# Patient Record
Sex: MALE | Race: WHITE | NOT HISPANIC OR LATINO | Employment: OTHER | URBAN - METROPOLITAN AREA
[De-identification: names, ages, dates, MRNs, and addresses within clinical notes are randomized per-mention and may not be internally consistent; named-entity substitution may affect disease eponyms.]

---

## 2019-09-07 ENCOUNTER — LAB REQUISITION (OUTPATIENT)
Dept: LAB | Facility: HOSPITAL | Age: 59
End: 2019-09-07

## 2019-09-07 DIAGNOSIS — Z00.00 ROUTINE GENERAL MEDICAL EXAMINATION AT A HEALTH CARE FACILITY: ICD-10-CM

## 2019-09-07 LAB
ALBUMIN SERPL-MCNC: 3.8 G/DL (ref 3.5–5.2)
ALBUMIN/GLOB SERPL: 1.2 G/DL
ALP SERPL-CCNC: 86 U/L (ref 39–117)
ALT SERPL W P-5'-P-CCNC: 19 U/L (ref 1–41)
ANION GAP SERPL CALCULATED.3IONS-SCNC: 14.5 MMOL/L (ref 5–15)
AST SERPL-CCNC: 15 U/L (ref 1–40)
BASOPHILS # BLD AUTO: 0.01 10*3/MM3 (ref 0–0.2)
BASOPHILS NFR BLD AUTO: 0.1 % (ref 0–1.5)
BILIRUB SERPL-MCNC: 0.3 MG/DL (ref 0.2–1.2)
BUN BLD-MCNC: 42 MG/DL (ref 6–20)
BUN/CREAT SERPL: 32.6 (ref 7–25)
CALCIUM SPEC-SCNC: 9.2 MG/DL (ref 8.6–10.5)
CHLORIDE SERPL-SCNC: 95 MMOL/L (ref 98–107)
CO2 SERPL-SCNC: 23.5 MMOL/L (ref 22–29)
CREAT BLD-MCNC: 1.29 MG/DL (ref 0.76–1.27)
DEPRECATED RDW RBC AUTO: 47.4 FL (ref 37–54)
EOSINOPHIL # BLD AUTO: 0 10*3/MM3 (ref 0–0.4)
EOSINOPHIL NFR BLD AUTO: 0 % (ref 0.3–6.2)
ERYTHROCYTE [DISTWIDTH] IN BLOOD BY AUTOMATED COUNT: 14.1 % (ref 12.3–15.4)
GFR SERPL CREATININE-BSD FRML MDRD: 57 ML/MIN/1.73
GFR SERPL CREATININE-BSD FRML MDRD: 70 ML/MIN/1.73
GLOBULIN UR ELPH-MCNC: 3.1 GM/DL
GLUCOSE BLD-MCNC: 139 MG/DL (ref 65–99)
HCT VFR BLD AUTO: 35.7 % (ref 37.5–51)
HGB BLD-MCNC: 11.4 G/DL (ref 13–17.7)
IMM GRANULOCYTES # BLD AUTO: 0.04 10*3/MM3 (ref 0–0.05)
IMM GRANULOCYTES NFR BLD AUTO: 0.4 % (ref 0–0.5)
LYMPHOCYTES # BLD AUTO: 0.9 10*3/MM3 (ref 0.7–3.1)
LYMPHOCYTES NFR BLD AUTO: 9.7 % (ref 19.6–45.3)
MCH RBC QN AUTO: 29.8 PG (ref 26.6–33)
MCHC RBC AUTO-ENTMCNC: 31.9 G/DL (ref 31.5–35.7)
MCV RBC AUTO: 93.5 FL (ref 79–97)
MONOCYTES # BLD AUTO: 0.84 10*3/MM3 (ref 0.1–0.9)
MONOCYTES NFR BLD AUTO: 9 % (ref 5–12)
NEUTROPHILS # BLD AUTO: 7.52 10*3/MM3 (ref 1.7–7)
NEUTROPHILS NFR BLD AUTO: 80.8 % (ref 42.7–76)
NRBC BLD AUTO-RTO: 0 /100 WBC (ref 0–0.2)
PLAT MORPH BLD: NORMAL
PLATELET # BLD AUTO: 159 10*3/MM3 (ref 140–450)
PMV BLD AUTO: 11.8 FL (ref 6–12)
POTASSIUM BLD-SCNC: 5.4 MMOL/L (ref 3.5–5.2)
PROT SERPL-MCNC: 6.9 G/DL (ref 6–8.5)
RBC # BLD AUTO: 3.82 10*6/MM3 (ref 4.14–5.8)
RBC MORPH BLD: NORMAL
SODIUM BLD-SCNC: 133 MMOL/L (ref 136–145)
WBC MORPH BLD: NORMAL
WBC NRBC COR # BLD: 9.31 10*3/MM3 (ref 3.4–10.8)

## 2019-09-07 PROCEDURE — 85007 BL SMEAR W/DIFF WBC COUNT: CPT

## 2019-09-07 PROCEDURE — 80053 COMPREHEN METABOLIC PANEL: CPT

## 2019-09-07 PROCEDURE — 85025 COMPLETE CBC W/AUTO DIFF WBC: CPT

## 2020-02-15 ENCOUNTER — LAB REQUISITION (OUTPATIENT)
Dept: LAB | Facility: HOSPITAL | Age: 60
End: 2020-02-15

## 2020-02-15 DIAGNOSIS — Z00.00 ROUTINE GENERAL MEDICAL EXAMINATION AT A HEALTH CARE FACILITY: ICD-10-CM

## 2020-02-15 LAB
BACTERIA UR QL AUTO: ABNORMAL /HPF
BILIRUB UR QL STRIP: NEGATIVE
CLARITY UR: ABNORMAL
COLOR UR: YELLOW
GLUCOSE UR STRIP-MCNC: ABNORMAL MG/DL
HGB UR QL STRIP.AUTO: ABNORMAL
HYALINE CASTS UR QL AUTO: ABNORMAL /LPF
KETONES UR QL STRIP: NEGATIVE
LEUKOCYTE ESTERASE UR QL STRIP.AUTO: ABNORMAL
NITRITE UR QL STRIP: NEGATIVE
PH UR STRIP.AUTO: <=5 [PH] (ref 5–8)
PROT UR QL STRIP: ABNORMAL
RBC # UR: ABNORMAL /HPF
REF LAB TEST METHOD: ABNORMAL
SP GR UR STRIP: 1.02 (ref 1–1.03)
SQUAMOUS #/AREA URNS HPF: ABNORMAL /HPF
UROBILINOGEN UR QL STRIP: ABNORMAL
WBC UR QL AUTO: ABNORMAL /HPF
YEAST URNS QL MICRO: ABNORMAL /HPF

## 2020-02-15 PROCEDURE — 81001 URINALYSIS AUTO W/SCOPE: CPT

## 2020-09-04 ENCOUNTER — HOSPITAL ENCOUNTER (OUTPATIENT)
Dept: GENERAL RADIOLOGY | Facility: HOSPITAL | Age: 60
Discharge: HOME | End: 2020-09-04
Attending: INTERNAL MEDICINE

## 2020-09-20 ENCOUNTER — HOSPITAL ENCOUNTER (OUTPATIENT)
Dept: OTHER | Facility: HOSPITAL | Age: 60
Discharge: HOME | End: 2020-09-20
Attending: INTERNAL MEDICINE

## 2020-12-14 ENCOUNTER — CONVERSION ENCOUNTER (OUTPATIENT)
Dept: OTHER | Facility: HOSPITAL | Age: 60
End: 2020-12-14

## 2020-12-14 ENCOUNTER — OFFICE VISIT CONVERTED (OUTPATIENT)
Dept: GASTROENTEROLOGY | Facility: CLINIC | Age: 60
End: 2020-12-14
Attending: NURSE PRACTITIONER

## 2021-02-03 ENCOUNTER — HOSPITAL ENCOUNTER (OUTPATIENT)
Dept: GASTROENTEROLOGY | Facility: HOSPITAL | Age: 61
Setting detail: HOSPITAL OUTPATIENT SURGERY
Discharge: HOME | End: 2021-02-03
Attending: INTERNAL MEDICINE

## 2021-02-03 LAB — GLUCOSE BLD-MCNC: 90 MG/DL (ref 70–99)

## 2021-05-10 NOTE — H&P
History and Physical      Patient Name: Maynor Geiger   Patient ID: [id number]   Sex: Male   YOB: 1960        Visit Date: December 14, 2020    Provider: HEMANTH Thakkar   Location: [facility] [place]   Location Address: [address] [place] [address]  [place]  [id number]   Location Phone: [phone]          Chief Complaint  · Diarrhea      History Of Present Illness  The patient is a 60 year old male, who presents on referral from Deshaun Saucedo MD, for a gastroenterology evaluation for diarrhea which has been loose to watery and they have been having 5 bowel movements per day when the symptoms occur. The diarrhea has been present daily since it began approximately months ago. The patient admits nocturnal diarrhea.   Besides diarrhea , the patient complains of no other GI tract problems.   He has not traveled out of the country and has not been exposed to contaminated water. The patient has/has not been exposed to recent antibiotics. He has not been exposed to recent antibiotics.      The patient, accompanied by his caregiver, has had diarrhea for several months.  Without any medication, he will have at least 5 loose to watery bowel movements per day.  He will be given Imodium as needed, which helps relieve his diarrhea.  He is currently taking colestipol 1 g twice daily.  He denies rectal bleeding and abdominal pain.  He does report occasional fecal incontinence and nocturnal diarrhea.  He denies nausea and vomiting.  He does not have postprandial bowel movements.  He has never had a colonoscopy.  There is no family history of colon cancer.         Past Medical History  Anxiety; Constipation; Depression; Diabetes; GERD; Hypertension; Hypothyroidism; Insomnia         Past Surgical History  heart surgery         Medication List  amitriptyline 25 mg oral tablet; benztropine 1 mg oral tablet; colestipol 1 gram oral tablet; dicyclomine 10 mg oral capsule; duloxetine 60 mg oral  "capsule,delayed release(DR/EC); gabapentin 800 mg oral tablet; ibuprofen 800 mg oral tablet; Imodium A-D 2 mg oral capsule; Jardiance 25 mg oral tablet; lactulose 10 gram oral packet; levothyroxine 25 mcg oral tablet; lisinopril 20 mg oral tablet; melatonin 5 mg oral tablet; metformin 1,000 mg oral tablet; metoprolol succinate 50 mg oral tablet extended release 24 hr; ondansetron HCl 4 mg oral tablet; pantoprazole 20 mg oral tablet,delayed release (DR/EC); tamsulosin 0.4 mg oral capsule         Allergy List  NO KNOWN DRUG ALLERGIES       Allergies Reconciled  Family Medical History  - No Family History of Colorectal Cancer         Social History  Alcohol; Tobacco (Never)         Review of Systems  · Constitutional  o Denies  o : chills, fever  · Eyes  o Denies  o : blurred vision, changes in vision  · Cardiovascular  o Denies  o : chest pain  · Respiratory  o Denies  o : shortness of breath  · Gastrointestinal  o Denies  o : nausea, vomiting  · Genitourinary  o Denies  o : dysuria, blood in urine  · Integument  o Denies  o : rash  · Neurologic  o Denies  o : tingling or numbness  · Musculoskeletal  o Denies  o : joint pain  · Endocrine  o Denies  o : weight gain, weight loss  · Psychiatric  o Denies  o : anxiety, depression      Vitals  Date Time BP Position Site L\R Cuff Size HR RR TEMP (F) WT  HT  BMI kg/m2 BSA m2 O2 Sat FR L/min FiO2        12/14/2020 02:10 /75 Sitting    107 - R 16  400lbs 0oz 5'  8\" 60.82 2.95 98 %            Physical Examination  · Constitutional  o Appearance  o : morbidly obese, well developed, in wheelchair  · Eyes  o Vision  o :   § Visual Fields  § : eyes move symmetrical in all directions  o Sclerae  o : sclerae anicteric  o Pupils and Irises  o : pupils equal and symetrical  · Neck  o Inspection/Palpation  o : Neck is supple without palpable lymphadenopathy.  o Thyroid  o : Thyroid is midline  · Respiratory  o Respiratory Effort  o : Breathing is unlabored.  o Inspection of " Chest  o : normal appearance, no retractions  o Auscultation of Lungs  o : Chest is clear to auscultation bilaterally.  · Cardiovascular  o Heart  o :   § Auscultation of Heart  § : no murmurs, rubs, or gallops  · Gastrointestinal  o Abdominal Examination  o : Abdomen is soft, nontender to palpation, with normal active bowel sounds, no appreciable hepatosplenomegaly.  · Skin and Subcutaneous Tissue  o General Inspection  o : Skin is without focal lesions. Skin turgor is normal.  · Psychiatric  o Mood and Affect  o : Mood and affect are appropriate to circumstances.          Assessment  · Diarrhea     787.91/R19.7  · Obesity     278.00/E66.9      Plan  · Orders  o Flexible Colonoscopy -Possible risks/complications, benefits, and alternatives to surgical or invasive procedure have been explained to patient and/or legal gaurdian. -Patient has been evaluated and can tolerate anethesia and/or sedation. Risk, benefits, and alternatives to anethesia and/or sedation have been explained to patient and/or legal gaurdian. (28331) - 787.91/R19.7 - 12/14/2020  o Stool culture (22320, 75125) - 787.91/R19.7 - 12/14/2020  o Lactoferrin (Fecal) Qualitative (90530) - 787.91/R19.7 - 12/14/2020  o C difficile Toxigenic Assay (PCR) [facility] (94604) - 787.91/R19.7 - 12/14/2020  o Pancreatic elastase stool (73181) - 787.91/R19.7 - 12/14/2020  o Stool Giardia and Cryptosporidium Enzyme Immunoassay [facility] (79512, 74734) - 787.91/R19.7 - 12/14/2020  o Fecal Fat, Qualitative (20524) - 787.91/R19.7 - 12/14/2020  o Fecal Occult Blood Diagnostic (Send to Lab) [facility] (16616) - 787.91/R19.7 - 12/14/2020  · Medications  o colestipol 1 gram oral tablet   SIG: take 2 tablets (2 gram) by oral route 2 times per day swallowing whole with any liquid. Do not crush, chew and/or divide. for 30 days   DISP: (120) Tablet with 0 refills  Adjusted on 12/14/2020     o Medications have been Reconciled  o Transition of Care or Provider Policy  · Instructions  o 60-year-old  male presenting today for the evaluation of diarrhea. His diarrhea has been present for several months. He believes he may have had his gallbladder removed approximately 4 years ago. I have increase his colestipol to 2 g twice daily. I have also ordered stool studies to rule out infectious causes of his diarrhea. I have recommended that he undergo colonoscopy for his symptoms. The patient is agreeable to the plan.  o Electronically Identified Patient Education Materials Provided Electronically            Electronically Signed by: HEMANTH Thakkar -Author on December 14, 2020 02:29:42 PM

## 2021-05-14 VITALS
BODY MASS INDEX: 47.74 KG/M2 | OXYGEN SATURATION: 98 % | HEART RATE: 107 BPM | SYSTOLIC BLOOD PRESSURE: 144 MMHG | RESPIRATION RATE: 16 BRPM | WEIGHT: 315 LBS | DIASTOLIC BLOOD PRESSURE: 75 MMHG | HEIGHT: 68 IN

## 2021-09-21 ENCOUNTER — APPOINTMENT (OUTPATIENT)
Dept: CT IMAGING | Facility: HOSPITAL | Age: 61
End: 2021-09-21

## 2021-09-21 ENCOUNTER — APPOINTMENT (OUTPATIENT)
Dept: GENERAL RADIOLOGY | Facility: HOSPITAL | Age: 61
End: 2021-09-21

## 2021-09-21 ENCOUNTER — HOSPITAL ENCOUNTER (INPATIENT)
Facility: HOSPITAL | Age: 61
LOS: 3 days | Discharge: NURSING, REHAB OR LONG-TERM CARE | End: 2021-09-24
Attending: EMERGENCY MEDICINE | Admitting: FAMILY MEDICINE

## 2021-09-21 DIAGNOSIS — R13.10 DYSPHAGIA, UNSPECIFIED TYPE: ICD-10-CM

## 2021-09-21 DIAGNOSIS — N28.9 RENAL INSUFFICIENCY: Primary | ICD-10-CM

## 2021-09-21 DIAGNOSIS — Z78.9 DECREASED ACTIVITIES OF DAILY LIVING (ADL): ICD-10-CM

## 2021-09-21 PROBLEM — N17.9 AKI (ACUTE KIDNEY INJURY) (HCC): Status: ACTIVE | Noted: 2021-09-21

## 2021-09-21 LAB
ALBUMIN SERPL-MCNC: 4 G/DL (ref 3.5–5.2)
ALBUMIN/GLOB SERPL: 1.1 G/DL
ALP SERPL-CCNC: 70 U/L (ref 39–117)
ALT SERPL W P-5'-P-CCNC: 8 U/L (ref 1–41)
ANION GAP SERPL CALCULATED.3IONS-SCNC: 13.8 MMOL/L (ref 5–15)
AST SERPL-CCNC: 8 U/L (ref 1–40)
BACTERIA UR QL AUTO: ABNORMAL /HPF
BASOPHILS # BLD AUTO: 0.01 10*3/MM3 (ref 0–0.2)
BASOPHILS NFR BLD AUTO: 0.1 % (ref 0–1.5)
BILIRUB SERPL-MCNC: 0.3 MG/DL (ref 0–1.2)
BILIRUB UR QL STRIP: NEGATIVE
BUN SERPL-MCNC: 55 MG/DL (ref 6–20)
BUN/CREAT SERPL: 24.7 (ref 7–25)
CALCIUM SPEC-SCNC: 9.1 MG/DL (ref 8.6–10.5)
CHLORIDE SERPL-SCNC: 98 MMOL/L (ref 98–107)
CK SERPL-CCNC: 36 U/L (ref 20–200)
CLARITY UR: ABNORMAL
CO2 SERPL-SCNC: 18.2 MMOL/L (ref 22–29)
COLOR UR: YELLOW
CREAT SERPL-MCNC: 2.23 MG/DL (ref 0.76–1.27)
D-LACTATE SERPL-SCNC: 1 MMOL/L (ref 0.5–2)
DEPRECATED RDW RBC AUTO: 45 FL (ref 37–54)
EOSINOPHIL # BLD AUTO: 0.04 10*3/MM3 (ref 0–0.4)
EOSINOPHIL NFR BLD AUTO: 0.4 % (ref 0.3–6.2)
ERYTHROCYTE [DISTWIDTH] IN BLOOD BY AUTOMATED COUNT: 14.6 % (ref 12.3–15.4)
GFR SERPL CREATININE-BSD FRML MDRD: 30 ML/MIN/1.73
GLOBULIN UR ELPH-MCNC: 3.8 GM/DL
GLUCOSE BLDC GLUCOMTR-MCNC: 112 MG/DL (ref 70–99)
GLUCOSE BLDC GLUCOMTR-MCNC: 140 MG/DL (ref 70–99)
GLUCOSE SERPL-MCNC: 146 MG/DL (ref 65–99)
GLUCOSE UR STRIP-MCNC: NEGATIVE MG/DL
HCT VFR BLD AUTO: 37.4 % (ref 37.5–51)
HGB BLD-MCNC: 12.5 G/DL (ref 13–17.7)
HGB UR QL STRIP.AUTO: ABNORMAL
HOLD SPECIMEN: NORMAL
HOLD SPECIMEN: NORMAL
HYALINE CASTS UR QL AUTO: ABNORMAL /LPF
IMM GRANULOCYTES # BLD AUTO: 0.05 10*3/MM3 (ref 0–0.05)
IMM GRANULOCYTES NFR BLD AUTO: 0.5 % (ref 0–0.5)
KETONES UR QL STRIP: ABNORMAL
LEUKOCYTE ESTERASE UR QL STRIP.AUTO: ABNORMAL
LYMPHOCYTES # BLD AUTO: 0.91 10*3/MM3 (ref 0.7–3.1)
LYMPHOCYTES NFR BLD AUTO: 8.8 % (ref 19.6–45.3)
MAGNESIUM SERPL-MCNC: 1.9 MG/DL (ref 1.6–2.6)
MCH RBC QN AUTO: 28 PG (ref 26.6–33)
MCHC RBC AUTO-ENTMCNC: 33.4 G/DL (ref 31.5–35.7)
MCV RBC AUTO: 83.9 FL (ref 79–97)
MONOCYTES # BLD AUTO: 0.95 10*3/MM3 (ref 0.1–0.9)
MONOCYTES NFR BLD AUTO: 9.2 % (ref 5–12)
NEUTROPHILS NFR BLD AUTO: 8.36 10*3/MM3 (ref 1.7–7)
NEUTROPHILS NFR BLD AUTO: 81 % (ref 42.7–76)
NITRITE UR QL STRIP: NEGATIVE
NRBC BLD AUTO-RTO: 0 /100 WBC (ref 0–0.2)
PH UR STRIP.AUTO: <=5 [PH] (ref 5–8)
PLATELET # BLD AUTO: 164 10*3/MM3 (ref 140–450)
PMV BLD AUTO: 10.6 FL (ref 6–12)
POTASSIUM SERPL-SCNC: 4.5 MMOL/L (ref 3.5–5.2)
PROT SERPL-MCNC: 7.8 G/DL (ref 6–8.5)
PROT UR QL STRIP: ABNORMAL
QT INTERVAL: 334 MS
RBC # BLD AUTO: 4.46 10*6/MM3 (ref 4.14–5.8)
RBC # UR: ABNORMAL /HPF
REF LAB TEST METHOD: ABNORMAL
SODIUM SERPL-SCNC: 130 MMOL/L (ref 136–145)
SP GR UR STRIP: 1.01 (ref 1–1.03)
SQUAMOUS #/AREA URNS HPF: ABNORMAL /HPF
TROPONIN T SERPL-MCNC: <0.01 NG/ML (ref 0–0.03)
UROBILINOGEN UR QL STRIP: ABNORMAL
WBC # BLD AUTO: 10.32 10*3/MM3 (ref 3.4–10.8)
WBC UR QL AUTO: ABNORMAL /HPF
WHOLE BLOOD HOLD SPECIMEN: NORMAL
WHOLE BLOOD HOLD SPECIMEN: NORMAL
YEAST URNS QL MICRO: ABNORMAL /HPF

## 2021-09-21 PROCEDURE — 99223 1ST HOSP IP/OBS HIGH 75: CPT | Performed by: FAMILY MEDICINE

## 2021-09-21 PROCEDURE — 80053 COMPREHEN METABOLIC PANEL: CPT | Performed by: EMERGENCY MEDICINE

## 2021-09-21 PROCEDURE — 81001 URINALYSIS AUTO W/SCOPE: CPT | Performed by: EMERGENCY MEDICINE

## 2021-09-21 PROCEDURE — 25010000002 CEFTRIAXONE PER 250 MG: Performed by: FAMILY MEDICINE

## 2021-09-21 PROCEDURE — 71045 X-RAY EXAM CHEST 1 VIEW: CPT

## 2021-09-21 PROCEDURE — 84484 ASSAY OF TROPONIN QUANT: CPT | Performed by: EMERGENCY MEDICINE

## 2021-09-21 PROCEDURE — 82962 GLUCOSE BLOOD TEST: CPT

## 2021-09-21 PROCEDURE — 87040 BLOOD CULTURE FOR BACTERIA: CPT | Performed by: EMERGENCY MEDICINE

## 2021-09-21 PROCEDURE — 70450 CT HEAD/BRAIN W/O DYE: CPT

## 2021-09-21 PROCEDURE — 99285 EMERGENCY DEPT VISIT HI MDM: CPT

## 2021-09-21 PROCEDURE — 83605 ASSAY OF LACTIC ACID: CPT | Performed by: EMERGENCY MEDICINE

## 2021-09-21 PROCEDURE — 87086 URINE CULTURE/COLONY COUNT: CPT | Performed by: EMERGENCY MEDICINE

## 2021-09-21 PROCEDURE — 93010 ELECTROCARDIOGRAM REPORT: CPT | Performed by: INTERNAL MEDICINE

## 2021-09-21 PROCEDURE — 85025 COMPLETE CBC W/AUTO DIFF WBC: CPT | Performed by: EMERGENCY MEDICINE

## 2021-09-21 PROCEDURE — 82550 ASSAY OF CK (CPK): CPT | Performed by: EMERGENCY MEDICINE

## 2021-09-21 PROCEDURE — 83735 ASSAY OF MAGNESIUM: CPT | Performed by: EMERGENCY MEDICINE

## 2021-09-21 PROCEDURE — 93005 ELECTROCARDIOGRAM TRACING: CPT | Performed by: EMERGENCY MEDICINE

## 2021-09-21 RX ORDER — ALUMINA, MAGNESIA, AND SIMETHICONE 2400; 2400; 240 MG/30ML; MG/30ML; MG/30ML
15 SUSPENSION ORAL EVERY 6 HOURS PRN
Status: DISCONTINUED | OUTPATIENT
Start: 2021-09-21 | End: 2021-09-24 | Stop reason: HOSPADM

## 2021-09-21 RX ORDER — NICOTINE POLACRILEX 4 MG
15 LOZENGE BUCCAL
Status: DISCONTINUED | OUTPATIENT
Start: 2021-09-21 | End: 2021-09-24 | Stop reason: HOSPADM

## 2021-09-21 RX ORDER — BISACODYL 5 MG/1
5 TABLET, DELAYED RELEASE ORAL DAILY PRN
Status: DISCONTINUED | OUTPATIENT
Start: 2021-09-21 | End: 2021-09-24 | Stop reason: HOSPADM

## 2021-09-21 RX ORDER — LEVOTHYROXINE SODIUM 0.03 MG/1
25 TABLET ORAL
COMMUNITY

## 2021-09-21 RX ORDER — BENZTROPINE MESYLATE 1 MG/1
1 TABLET ORAL EVERY 12 HOURS SCHEDULED
Status: DISCONTINUED | OUTPATIENT
Start: 2021-09-21 | End: 2021-09-24 | Stop reason: HOSPADM

## 2021-09-21 RX ORDER — BRIMONIDINE TARTRATE AND TIMOLOL MALEATE 2; 5 MG/ML; MG/ML
1 SOLUTION OPHTHALMIC EVERY 12 HOURS
COMMUNITY
End: 2021-12-09

## 2021-09-21 RX ORDER — AMITRIPTYLINE HYDROCHLORIDE 25 MG/1
25 TABLET, FILM COATED ORAL NIGHTLY
Status: DISCONTINUED | OUTPATIENT
Start: 2021-09-21 | End: 2021-09-24 | Stop reason: HOSPADM

## 2021-09-21 RX ORDER — GABAPENTIN 800 MG/1
800 TABLET ORAL 3 TIMES DAILY
COMMUNITY

## 2021-09-21 RX ORDER — SODIUM CHLORIDE 0.9 % (FLUSH) 0.9 %
10 SYRINGE (ML) INJECTION EVERY 12 HOURS SCHEDULED
Status: DISCONTINUED | OUTPATIENT
Start: 2021-09-21 | End: 2021-09-24 | Stop reason: HOSPADM

## 2021-09-21 RX ORDER — DULOXETIN HYDROCHLORIDE 30 MG/1
60 CAPSULE, DELAYED RELEASE ORAL DAILY
Status: DISCONTINUED | OUTPATIENT
Start: 2021-09-21 | End: 2021-09-24 | Stop reason: HOSPADM

## 2021-09-21 RX ORDER — DULOXETIN HYDROCHLORIDE 60 MG/1
60 CAPSULE, DELAYED RELEASE ORAL DAILY
COMMUNITY

## 2021-09-21 RX ORDER — PANTOPRAZOLE SODIUM 20 MG/1
20 TABLET, DELAYED RELEASE ORAL DAILY
COMMUNITY

## 2021-09-21 RX ORDER — NETARSUDIL AND LATANOPROST OPHTHALMIC SOLUTION, 0.02%/0.005% .2; .05 MG/ML; MG/ML
1 SOLUTION/ DROPS OPHTHALMIC; TOPICAL NIGHTLY
COMMUNITY
End: 2021-12-09

## 2021-09-21 RX ORDER — DIPHENOXYLATE HYDROCHLORIDE AND ATROPINE SULFATE 2.5; .025 MG/1; MG/1
1 TABLET ORAL EVERY 6 HOURS PRN
COMMUNITY
End: 2022-03-18 | Stop reason: HOSPADM

## 2021-09-21 RX ORDER — LIDOCAINE 5% 5 G/100G
1 CREAM TOPICAL NIGHTLY PRN
COMMUNITY

## 2021-09-21 RX ORDER — LOPERAMIDE HYDROCHLORIDE 2 MG/1
2 CAPSULE ORAL AS NEEDED
COMMUNITY
End: 2021-12-09

## 2021-09-21 RX ORDER — CYANOCOBALAMIN 1000 UG/ML
1000 INJECTION, SOLUTION INTRAMUSCULAR; SUBCUTANEOUS
COMMUNITY

## 2021-09-21 RX ORDER — ONDANSETRON 2 MG/ML
4 INJECTION INTRAMUSCULAR; INTRAVENOUS EVERY 6 HOURS PRN
Status: DISCONTINUED | OUTPATIENT
Start: 2021-09-21 | End: 2021-09-24 | Stop reason: HOSPADM

## 2021-09-21 RX ORDER — SODIUM CHLORIDE 0.9 % (FLUSH) 0.9 %
10 SYRINGE (ML) INJECTION AS NEEDED
Status: DISCONTINUED | OUTPATIENT
Start: 2021-09-21 | End: 2021-09-24 | Stop reason: HOSPADM

## 2021-09-21 RX ORDER — BENZTROPINE MESYLATE 1 MG/1
1 TABLET ORAL 2 TIMES DAILY
COMMUNITY

## 2021-09-21 RX ORDER — PHENOL 1.4 %
10 AEROSOL, SPRAY (ML) MUCOUS MEMBRANE NIGHTLY
COMMUNITY

## 2021-09-21 RX ORDER — LEVOTHYROXINE SODIUM 0.03 MG/1
25 TABLET ORAL
Status: DISCONTINUED | OUTPATIENT
Start: 2021-09-22 | End: 2021-09-24 | Stop reason: HOSPADM

## 2021-09-21 RX ORDER — GABAPENTIN 300 MG/1
300 CAPSULE ORAL EVERY 8 HOURS SCHEDULED
Status: DISCONTINUED | OUTPATIENT
Start: 2021-09-21 | End: 2021-09-24 | Stop reason: HOSPADM

## 2021-09-21 RX ORDER — POTASSIUM CHLORIDE 20 MEQ/1
20 TABLET, EXTENDED RELEASE ORAL DAILY
COMMUNITY
End: 2022-03-18 | Stop reason: HOSPADM

## 2021-09-21 RX ORDER — AMITRIPTYLINE HYDROCHLORIDE 50 MG/1
50 TABLET, FILM COATED ORAL
COMMUNITY
End: 2022-03-18 | Stop reason: HOSPADM

## 2021-09-21 RX ORDER — TAMSULOSIN HYDROCHLORIDE 0.4 MG/1
0.4 CAPSULE ORAL
COMMUNITY
End: 2022-03-18 | Stop reason: HOSPADM

## 2021-09-21 RX ORDER — LACTULOSE 10 G/15ML
30 SOLUTION ORAL DAILY PRN
COMMUNITY

## 2021-09-21 RX ORDER — CEFTRIAXONE SODIUM 1 G/50ML
1 INJECTION, SOLUTION INTRAVENOUS DAILY
Status: DISCONTINUED | OUTPATIENT
Start: 2021-09-21 | End: 2021-09-24 | Stop reason: HOSPADM

## 2021-09-21 RX ORDER — ONDANSETRON 4 MG/1
4 TABLET, FILM COATED ORAL EVERY 6 HOURS PRN
COMMUNITY
End: 2021-10-19 | Stop reason: ALTCHOICE

## 2021-09-21 RX ORDER — LISINOPRIL 5 MG/1
5 TABLET ORAL DAILY
COMMUNITY
End: 2022-03-18 | Stop reason: HOSPADM

## 2021-09-21 RX ORDER — DORZOLAMIDE HCL 20 MG/ML
1 SOLUTION/ DROPS OPHTHALMIC 2 TIMES DAILY
COMMUNITY
End: 2021-12-09

## 2021-09-21 RX ORDER — ACETAZOLAMIDE 250 MG/1
500 TABLET ORAL 2 TIMES DAILY
Status: DISCONTINUED | OUTPATIENT
Start: 2021-09-21 | End: 2021-09-24 | Stop reason: HOSPADM

## 2021-09-21 RX ORDER — BISACODYL 10 MG
10 SUPPOSITORY, RECTAL RECTAL DAILY PRN
Status: DISCONTINUED | OUTPATIENT
Start: 2021-09-21 | End: 2021-09-24 | Stop reason: HOSPADM

## 2021-09-21 RX ORDER — IBUPROFEN 800 MG/1
800 TABLET ORAL EVERY 6 HOURS PRN
COMMUNITY
End: 2022-03-18 | Stop reason: HOSPADM

## 2021-09-21 RX ORDER — ACETAZOLAMIDE 500 MG/1
500 CAPSULE, EXTENDED RELEASE ORAL 2 TIMES DAILY
COMMUNITY
End: 2021-10-22

## 2021-09-21 RX ORDER — NYSTATIN 100000 [USP'U]/G
1 POWDER TOPICAL 2 TIMES DAILY
COMMUNITY
End: 2021-12-09

## 2021-09-21 RX ORDER — METHENAMINE HIPPURATE 1000 MG/1
1 TABLET ORAL 2 TIMES DAILY WITH MEALS
COMMUNITY

## 2021-09-21 RX ORDER — DEXTROSE MONOHYDRATE 100 MG/ML
25 INJECTION, SOLUTION INTRAVENOUS
Status: DISCONTINUED | OUTPATIENT
Start: 2021-09-21 | End: 2021-09-24 | Stop reason: HOSPADM

## 2021-09-21 RX ORDER — AMOXICILLIN 250 MG
2 CAPSULE ORAL 2 TIMES DAILY
Status: DISCONTINUED | OUTPATIENT
Start: 2021-09-21 | End: 2021-09-24 | Stop reason: HOSPADM

## 2021-09-21 RX ORDER — PANTOPRAZOLE SODIUM 40 MG/1
40 TABLET, DELAYED RELEASE ORAL
Status: DISCONTINUED | OUTPATIENT
Start: 2021-09-22 | End: 2021-09-24 | Stop reason: HOSPADM

## 2021-09-21 RX ORDER — GLYCERIN 0.25 %
2 DROPS OPHTHALMIC (EYE) 3 TIMES DAILY PRN
COMMUNITY
End: 2021-12-09

## 2021-09-21 RX ORDER — ACETAZOLAMIDE 500 MG/1
500 CAPSULE, EXTENDED RELEASE ORAL 2 TIMES DAILY
Status: DISCONTINUED | OUTPATIENT
Start: 2021-09-21 | End: 2021-09-21

## 2021-09-21 RX ORDER — SODIUM CHLORIDE, SODIUM LACTATE, POTASSIUM CHLORIDE, CALCIUM CHLORIDE 600; 310; 30; 20 MG/100ML; MG/100ML; MG/100ML; MG/100ML
100 INJECTION, SOLUTION INTRAVENOUS CONTINUOUS
Status: DISCONTINUED | OUTPATIENT
Start: 2021-09-21 | End: 2021-09-24 | Stop reason: HOSPADM

## 2021-09-21 RX ORDER — ACETAMINOPHEN 325 MG/1
650 TABLET ORAL EVERY 4 HOURS PRN
Status: DISCONTINUED | OUTPATIENT
Start: 2021-09-21 | End: 2021-09-24 | Stop reason: HOSPADM

## 2021-09-21 RX ORDER — TAMSULOSIN HYDROCHLORIDE 0.4 MG/1
0.4 CAPSULE ORAL NIGHTLY
Status: DISCONTINUED | OUTPATIENT
Start: 2021-09-21 | End: 2021-09-24 | Stop reason: HOSPADM

## 2021-09-21 RX ORDER — DICYCLOMINE HYDROCHLORIDE 10 MG/1
10 CAPSULE ORAL
COMMUNITY

## 2021-09-21 RX ORDER — SALIVA STIMULANT COMB. NO.7
1 GEL (GRAM) MUCOUS MEMBRANE EVERY 6 HOURS PRN
COMMUNITY
End: 2021-12-09

## 2021-09-21 RX ORDER — POLYETHYLENE GLYCOL 3350 17 G/17G
17 POWDER, FOR SOLUTION ORAL DAILY PRN
Status: DISCONTINUED | OUTPATIENT
Start: 2021-09-21 | End: 2021-09-24 | Stop reason: HOSPADM

## 2021-09-21 RX ADMIN — SODIUM CHLORIDE, POTASSIUM CHLORIDE, SODIUM LACTATE AND CALCIUM CHLORIDE 1000 ML: 600; 310; 30; 20 INJECTION, SOLUTION INTRAVENOUS at 19:36

## 2021-09-21 RX ADMIN — SODIUM CHLORIDE 1000 ML: 9 INJECTION, SOLUTION INTRAVENOUS at 11:42

## 2021-09-21 RX ADMIN — CEFTRIAXONE SODIUM 1 G: 1 INJECTION, SOLUTION INTRAVENOUS at 17:23

## 2021-09-21 RX ADMIN — SODIUM CHLORIDE, POTASSIUM CHLORIDE, SODIUM LACTATE AND CALCIUM CHLORIDE 100 ML/HR: 600; 310; 30; 20 INJECTION, SOLUTION INTRAVENOUS at 17:44

## 2021-09-22 LAB
ALBUMIN SERPL-MCNC: 3.2 G/DL (ref 3.5–5.2)
ALP SERPL-CCNC: 64 U/L (ref 39–117)
ALT SERPL W P-5'-P-CCNC: 8 U/L (ref 1–41)
ANION GAP SERPL CALCULATED.3IONS-SCNC: 13.2 MMOL/L (ref 5–15)
AST SERPL-CCNC: 9 U/L (ref 1–40)
BACTERIA SPEC AEROBE CULT: ABNORMAL
BASOPHILS # BLD AUTO: 0.01 10*3/MM3 (ref 0–0.2)
BASOPHILS NFR BLD AUTO: 0.2 % (ref 0–1.5)
BILIRUB CONJ SERPL-MCNC: <0.2 MG/DL (ref 0–0.3)
BILIRUB INDIRECT SERPL-MCNC: ABNORMAL MG/DL
BILIRUB SERPL-MCNC: 0.3 MG/DL (ref 0–1.2)
BUN SERPL-MCNC: 34 MG/DL (ref 6–20)
BUN/CREAT SERPL: 30.4 (ref 7–25)
CALCIUM SPEC-SCNC: 8.8 MG/DL (ref 8.6–10.5)
CHLORIDE SERPL-SCNC: 101 MMOL/L (ref 98–107)
CO2 SERPL-SCNC: 15.8 MMOL/L (ref 22–29)
CREAT SERPL-MCNC: 1.12 MG/DL (ref 0.76–1.27)
DEPRECATED RDW RBC AUTO: 47.1 FL (ref 37–54)
EOSINOPHIL # BLD AUTO: 0.12 10*3/MM3 (ref 0–0.4)
EOSINOPHIL NFR BLD AUTO: 2 % (ref 0.3–6.2)
ERYTHROCYTE [DISTWIDTH] IN BLOOD BY AUTOMATED COUNT: 14.9 % (ref 12.3–15.4)
GFR SERPL CREATININE-BSD FRML MDRD: 67 ML/MIN/1.73
GLUCOSE BLDC GLUCOMTR-MCNC: 104 MG/DL (ref 70–99)
GLUCOSE BLDC GLUCOMTR-MCNC: 110 MG/DL (ref 70–99)
GLUCOSE BLDC GLUCOMTR-MCNC: 111 MG/DL (ref 70–99)
GLUCOSE BLDC GLUCOMTR-MCNC: 93 MG/DL (ref 70–99)
GLUCOSE SERPL-MCNC: 127 MG/DL (ref 65–99)
HCT VFR BLD AUTO: 36.2 % (ref 37.5–51)
HGB BLD-MCNC: 11.7 G/DL (ref 13–17.7)
IMM GRANULOCYTES # BLD AUTO: 0.01 10*3/MM3 (ref 0–0.05)
IMM GRANULOCYTES NFR BLD AUTO: 0.2 % (ref 0–0.5)
LYMPHOCYTES # BLD AUTO: 1.05 10*3/MM3 (ref 0.7–3.1)
LYMPHOCYTES NFR BLD AUTO: 17.3 % (ref 19.6–45.3)
MAGNESIUM SERPL-MCNC: 1.7 MG/DL (ref 1.6–2.6)
MCH RBC QN AUTO: 28.1 PG (ref 26.6–33)
MCHC RBC AUTO-ENTMCNC: 32.3 G/DL (ref 31.5–35.7)
MCV RBC AUTO: 86.8 FL (ref 79–97)
MONOCYTES # BLD AUTO: 0.54 10*3/MM3 (ref 0.1–0.9)
MONOCYTES NFR BLD AUTO: 8.9 % (ref 5–12)
NEUTROPHILS NFR BLD AUTO: 4.35 10*3/MM3 (ref 1.7–7)
NEUTROPHILS NFR BLD AUTO: 71.4 % (ref 42.7–76)
NRBC BLD AUTO-RTO: 0 /100 WBC (ref 0–0.2)
PHOSPHATE SERPL-MCNC: 2.4 MG/DL (ref 2.5–4.5)
PLATELET # BLD AUTO: 142 10*3/MM3 (ref 140–450)
PMV BLD AUTO: 11 FL (ref 6–12)
POTASSIUM SERPL-SCNC: 4 MMOL/L (ref 3.5–5.2)
PROT SERPL-MCNC: 7 G/DL (ref 6–8.5)
RBC # BLD AUTO: 4.17 10*6/MM3 (ref 4.14–5.8)
SODIUM SERPL-SCNC: 130 MMOL/L (ref 136–145)
WBC # BLD AUTO: 6.08 10*3/MM3 (ref 3.4–10.8)

## 2021-09-22 PROCEDURE — 92610 EVALUATE SWALLOWING FUNCTION: CPT

## 2021-09-22 PROCEDURE — 97165 OT EVAL LOW COMPLEX 30 MIN: CPT

## 2021-09-22 PROCEDURE — 99232 SBSQ HOSP IP/OBS MODERATE 35: CPT | Performed by: FAMILY MEDICINE

## 2021-09-22 PROCEDURE — 25010000002 CEFTRIAXONE PER 250 MG: Performed by: FAMILY MEDICINE

## 2021-09-22 PROCEDURE — 83735 ASSAY OF MAGNESIUM: CPT | Performed by: FAMILY MEDICINE

## 2021-09-22 PROCEDURE — 36415 COLL VENOUS BLD VENIPUNCTURE: CPT | Performed by: FAMILY MEDICINE

## 2021-09-22 PROCEDURE — 82962 GLUCOSE BLOOD TEST: CPT

## 2021-09-22 PROCEDURE — 80076 HEPATIC FUNCTION PANEL: CPT | Performed by: FAMILY MEDICINE

## 2021-09-22 PROCEDURE — 97161 PT EVAL LOW COMPLEX 20 MIN: CPT

## 2021-09-22 PROCEDURE — 84100 ASSAY OF PHOSPHORUS: CPT | Performed by: FAMILY MEDICINE

## 2021-09-22 PROCEDURE — 80048 BASIC METABOLIC PNL TOTAL CA: CPT | Performed by: FAMILY MEDICINE

## 2021-09-22 PROCEDURE — 85025 COMPLETE CBC W/AUTO DIFF WBC: CPT | Performed by: FAMILY MEDICINE

## 2021-09-22 RX ADMIN — DOCUSATE SODIUM 50MG AND SENNOSIDES 8.6MG 2 TABLET: 8.6; 5 TABLET, FILM COATED ORAL at 23:05

## 2021-09-22 RX ADMIN — SODIUM CHLORIDE, POTASSIUM CHLORIDE, SODIUM LACTATE AND CALCIUM CHLORIDE 100 ML/HR: 600; 310; 30; 20 INJECTION, SOLUTION INTRAVENOUS at 23:03

## 2021-09-22 RX ADMIN — TAMSULOSIN HYDROCHLORIDE 0.4 MG: 0.4 CAPSULE ORAL at 23:04

## 2021-09-22 RX ADMIN — BENZTROPINE MESYLATE 1 MG: 1 TABLET ORAL at 08:54

## 2021-09-22 RX ADMIN — AMITRIPTYLINE HYDROCHLORIDE 25 MG: 25 TABLET, FILM COATED ORAL at 23:03

## 2021-09-22 RX ADMIN — ACETAMINOPHEN 650 MG: 325 TABLET ORAL at 03:41

## 2021-09-22 RX ADMIN — PANTOPRAZOLE SODIUM 40 MG: 40 TABLET, DELAYED RELEASE ORAL at 05:45

## 2021-09-22 RX ADMIN — GABAPENTIN 300 MG: 300 CAPSULE ORAL at 16:19

## 2021-09-22 RX ADMIN — SODIUM CHLORIDE, POTASSIUM CHLORIDE, SODIUM LACTATE AND CALCIUM CHLORIDE 100 ML/HR: 600; 310; 30; 20 INJECTION, SOLUTION INTRAVENOUS at 11:30

## 2021-09-22 RX ADMIN — GABAPENTIN 300 MG: 300 CAPSULE ORAL at 05:45

## 2021-09-22 RX ADMIN — SODIUM CHLORIDE, POTASSIUM CHLORIDE, SODIUM LACTATE AND CALCIUM CHLORIDE 100 ML/HR: 600; 310; 30; 20 INJECTION, SOLUTION INTRAVENOUS at 01:17

## 2021-09-22 RX ADMIN — ACETAZOLAMIDE 500 MG: 250 TABLET ORAL at 23:05

## 2021-09-22 RX ADMIN — ACETAZOLAMIDE 500 MG: 250 TABLET ORAL at 08:53

## 2021-09-22 RX ADMIN — SODIUM CHLORIDE, POTASSIUM CHLORIDE, SODIUM LACTATE AND CALCIUM CHLORIDE 500 ML: 600; 310; 30; 20 INJECTION, SOLUTION INTRAVENOUS at 00:36

## 2021-09-22 RX ADMIN — BENZTROPINE MESYLATE 1 MG: 1 TABLET ORAL at 23:04

## 2021-09-22 RX ADMIN — DOCUSATE SODIUM 50MG AND SENNOSIDES 8.6MG 2 TABLET: 8.6; 5 TABLET, FILM COATED ORAL at 08:55

## 2021-09-22 RX ADMIN — DULOXETINE HYDROCHLORIDE 60 MG: 30 CAPSULE, DELAYED RELEASE ORAL at 08:54

## 2021-09-22 RX ADMIN — SODIUM CHLORIDE, PRESERVATIVE FREE 10 ML: 5 INJECTION INTRAVENOUS at 08:55

## 2021-09-22 RX ADMIN — GABAPENTIN 300 MG: 300 CAPSULE ORAL at 23:04

## 2021-09-22 RX ADMIN — CEFTRIAXONE SODIUM 1 G: 1 INJECTION, SOLUTION INTRAVENOUS at 11:47

## 2021-09-22 RX ADMIN — LEVOTHYROXINE SODIUM 25 MCG: 0.03 TABLET ORAL at 05:45

## 2021-09-23 LAB
ALBUMIN SERPL-MCNC: 3.2 G/DL (ref 3.5–5.2)
ALP SERPL-CCNC: 65 U/L (ref 39–117)
ALT SERPL W P-5'-P-CCNC: 9 U/L (ref 1–41)
ANION GAP SERPL CALCULATED.3IONS-SCNC: 13.7 MMOL/L (ref 5–15)
AST SERPL-CCNC: 12 U/L (ref 1–40)
BASOPHILS # BLD AUTO: 0 10*3/MM3 (ref 0–0.2)
BASOPHILS NFR BLD AUTO: 0 % (ref 0–1.5)
BILIRUB CONJ SERPL-MCNC: <0.2 MG/DL (ref 0–0.3)
BILIRUB INDIRECT SERPL-MCNC: ABNORMAL MG/DL
BILIRUB SERPL-MCNC: 0.3 MG/DL (ref 0–1.2)
BUN SERPL-MCNC: 26 MG/DL (ref 6–20)
BUN/CREAT SERPL: 26 (ref 7–25)
CALCIUM SPEC-SCNC: 9.1 MG/DL (ref 8.6–10.5)
CHLORIDE SERPL-SCNC: 100 MMOL/L (ref 98–107)
CO2 SERPL-SCNC: 15.3 MMOL/L (ref 22–29)
CREAT SERPL-MCNC: 1 MG/DL (ref 0.76–1.27)
DEPRECATED RDW RBC AUTO: 45.8 FL (ref 37–54)
EOSINOPHIL # BLD AUTO: 0.15 10*3/MM3 (ref 0–0.4)
EOSINOPHIL NFR BLD AUTO: 2.3 % (ref 0.3–6.2)
ERYTHROCYTE [DISTWIDTH] IN BLOOD BY AUTOMATED COUNT: 14.8 % (ref 12.3–15.4)
GFR SERPL CREATININE-BSD FRML MDRD: 76 ML/MIN/1.73
GLUCOSE BLDC GLUCOMTR-MCNC: 128 MG/DL (ref 70–99)
GLUCOSE BLDC GLUCOMTR-MCNC: 92 MG/DL (ref 70–99)
GLUCOSE BLDC GLUCOMTR-MCNC: 99 MG/DL (ref 70–99)
GLUCOSE SERPL-MCNC: 104 MG/DL (ref 65–99)
HCT VFR BLD AUTO: 33.4 % (ref 37.5–51)
HGB BLD-MCNC: 11 G/DL (ref 13–17.7)
IMM GRANULOCYTES # BLD AUTO: 0.04 10*3/MM3 (ref 0–0.05)
IMM GRANULOCYTES NFR BLD AUTO: 0.6 % (ref 0–0.5)
LYMPHOCYTES # BLD AUTO: 0.99 10*3/MM3 (ref 0.7–3.1)
LYMPHOCYTES NFR BLD AUTO: 15 % (ref 19.6–45.3)
MAGNESIUM SERPL-MCNC: 1.6 MG/DL (ref 1.6–2.6)
MCH RBC QN AUTO: 28.1 PG (ref 26.6–33)
MCHC RBC AUTO-ENTMCNC: 32.9 G/DL (ref 31.5–35.7)
MCV RBC AUTO: 85.2 FL (ref 79–97)
MONOCYTES # BLD AUTO: 0.7 10*3/MM3 (ref 0.1–0.9)
MONOCYTES NFR BLD AUTO: 10.6 % (ref 5–12)
NEUTROPHILS NFR BLD AUTO: 4.71 10*3/MM3 (ref 1.7–7)
NEUTROPHILS NFR BLD AUTO: 71.5 % (ref 42.7–76)
NRBC BLD AUTO-RTO: 0 /100 WBC (ref 0–0.2)
PHOSPHATE SERPL-MCNC: 2.9 MG/DL (ref 2.5–4.5)
PLATELET # BLD AUTO: 136 10*3/MM3 (ref 140–450)
PMV BLD AUTO: 11.3 FL (ref 6–12)
POTASSIUM SERPL-SCNC: 4.6 MMOL/L (ref 3.5–5.2)
PROT SERPL-MCNC: 7.3 G/DL (ref 6–8.5)
RBC # BLD AUTO: 3.92 10*6/MM3 (ref 4.14–5.8)
RBC MORPH BLD: NORMAL
SMALL PLATELETS BLD QL SMEAR: NORMAL
SODIUM SERPL-SCNC: 129 MMOL/L (ref 136–145)
WBC # BLD AUTO: 6.59 10*3/MM3 (ref 3.4–10.8)
WBC MORPH BLD: NORMAL

## 2021-09-23 PROCEDURE — 80076 HEPATIC FUNCTION PANEL: CPT | Performed by: FAMILY MEDICINE

## 2021-09-23 PROCEDURE — 87635 SARS-COV-2 COVID-19 AMP PRB: CPT | Performed by: FAMILY MEDICINE

## 2021-09-23 PROCEDURE — 83735 ASSAY OF MAGNESIUM: CPT | Performed by: FAMILY MEDICINE

## 2021-09-23 PROCEDURE — 80048 BASIC METABOLIC PNL TOTAL CA: CPT | Performed by: FAMILY MEDICINE

## 2021-09-23 PROCEDURE — 25010000002 CEFTRIAXONE PER 250 MG: Performed by: FAMILY MEDICINE

## 2021-09-23 PROCEDURE — 84100 ASSAY OF PHOSPHORUS: CPT | Performed by: FAMILY MEDICINE

## 2021-09-23 PROCEDURE — 82962 GLUCOSE BLOOD TEST: CPT

## 2021-09-23 PROCEDURE — 85007 BL SMEAR W/DIFF WBC COUNT: CPT | Performed by: FAMILY MEDICINE

## 2021-09-23 PROCEDURE — 85025 COMPLETE CBC W/AUTO DIFF WBC: CPT | Performed by: FAMILY MEDICINE

## 2021-09-23 PROCEDURE — 99239 HOSP IP/OBS DSCHRG MGMT >30: CPT | Performed by: FAMILY MEDICINE

## 2021-09-23 RX ORDER — CEFDINIR 300 MG/1
300 CAPSULE ORAL 2 TIMES DAILY
Qty: 14 CAPSULE | Refills: 0 | Status: SHIPPED | OUTPATIENT
Start: 2021-09-23 | End: 2021-09-30

## 2021-09-23 RX ADMIN — BENZTROPINE MESYLATE 1 MG: 1 TABLET ORAL at 08:50

## 2021-09-23 RX ADMIN — GABAPENTIN 300 MG: 300 CAPSULE ORAL at 07:56

## 2021-09-23 RX ADMIN — CEFTRIAXONE SODIUM 1 G: 1 INJECTION, SOLUTION INTRAVENOUS at 08:49

## 2021-09-23 RX ADMIN — SODIUM CHLORIDE, PRESERVATIVE FREE 10 ML: 5 INJECTION INTRAVENOUS at 20:21

## 2021-09-23 RX ADMIN — ACETAZOLAMIDE 500 MG: 250 TABLET ORAL at 08:50

## 2021-09-23 RX ADMIN — DOCUSATE SODIUM 50MG AND SENNOSIDES 8.6MG 2 TABLET: 8.6; 5 TABLET, FILM COATED ORAL at 20:21

## 2021-09-23 RX ADMIN — GABAPENTIN 300 MG: 300 CAPSULE ORAL at 20:20

## 2021-09-23 RX ADMIN — AMITRIPTYLINE HYDROCHLORIDE 25 MG: 25 TABLET, FILM COATED ORAL at 20:21

## 2021-09-23 RX ADMIN — TAMSULOSIN HYDROCHLORIDE 0.4 MG: 0.4 CAPSULE ORAL at 20:21

## 2021-09-23 RX ADMIN — GABAPENTIN 300 MG: 300 CAPSULE ORAL at 14:05

## 2021-09-23 RX ADMIN — PANTOPRAZOLE SODIUM 40 MG: 40 TABLET, DELAYED RELEASE ORAL at 07:55

## 2021-09-23 RX ADMIN — LEVOTHYROXINE SODIUM 25 MCG: 0.03 TABLET ORAL at 07:55

## 2021-09-23 RX ADMIN — BENZTROPINE MESYLATE 1 MG: 1 TABLET ORAL at 20:21

## 2021-09-23 RX ADMIN — SODIUM CHLORIDE, POTASSIUM CHLORIDE, SODIUM LACTATE AND CALCIUM CHLORIDE 100 ML/HR: 600; 310; 30; 20 INJECTION, SOLUTION INTRAVENOUS at 08:52

## 2021-09-23 RX ADMIN — DOCUSATE SODIUM 50MG AND SENNOSIDES 8.6MG 2 TABLET: 8.6; 5 TABLET, FILM COATED ORAL at 08:50

## 2021-09-23 RX ADMIN — DULOXETINE HYDROCHLORIDE 60 MG: 30 CAPSULE, DELAYED RELEASE ORAL at 08:50

## 2021-09-23 RX ADMIN — ACETAZOLAMIDE 500 MG: 250 TABLET ORAL at 20:20

## 2021-09-24 VITALS
RESPIRATION RATE: 18 BRPM | BODY MASS INDEX: 50.62 KG/M2 | DIASTOLIC BLOOD PRESSURE: 72 MMHG | SYSTOLIC BLOOD PRESSURE: 129 MMHG | WEIGHT: 315 LBS | TEMPERATURE: 97.8 F | HEART RATE: 100 BPM | HEIGHT: 66 IN | OXYGEN SATURATION: 100 %

## 2021-09-24 LAB
ANION GAP SERPL CALCULATED.3IONS-SCNC: 11.9 MMOL/L (ref 5–15)
BASOPHILS # BLD AUTO: 0.01 10*3/MM3 (ref 0–0.2)
BASOPHILS NFR BLD AUTO: 0.2 % (ref 0–1.5)
BUN SERPL-MCNC: 19 MG/DL (ref 6–20)
BUN/CREAT SERPL: 22.4 (ref 7–25)
CALCIUM SPEC-SCNC: 8.8 MG/DL (ref 8.6–10.5)
CHLORIDE SERPL-SCNC: 99 MMOL/L (ref 98–107)
CO2 SERPL-SCNC: 19.1 MMOL/L (ref 22–29)
CREAT SERPL-MCNC: 0.85 MG/DL (ref 0.76–1.27)
DEPRECATED RDW RBC AUTO: 45.6 FL (ref 37–54)
EOSINOPHIL # BLD AUTO: 0.13 10*3/MM3 (ref 0–0.4)
EOSINOPHIL NFR BLD AUTO: 2.1 % (ref 0.3–6.2)
ERYTHROCYTE [DISTWIDTH] IN BLOOD BY AUTOMATED COUNT: 14.8 % (ref 12.3–15.4)
GFR SERPL CREATININE-BSD FRML MDRD: 92 ML/MIN/1.73
GLUCOSE BLDC GLUCOMTR-MCNC: 113 MG/DL (ref 70–99)
GLUCOSE BLDC GLUCOMTR-MCNC: 131 MG/DL (ref 70–99)
GLUCOSE SERPL-MCNC: 117 MG/DL (ref 65–99)
HCT VFR BLD AUTO: 33.9 % (ref 37.5–51)
HGB BLD-MCNC: 11.3 G/DL (ref 13–17.7)
IMM GRANULOCYTES # BLD AUTO: 0.02 10*3/MM3 (ref 0–0.05)
IMM GRANULOCYTES NFR BLD AUTO: 0.3 % (ref 0–0.5)
LYMPHOCYTES # BLD AUTO: 0.82 10*3/MM3 (ref 0.7–3.1)
LYMPHOCYTES NFR BLD AUTO: 13.1 % (ref 19.6–45.3)
MAGNESIUM SERPL-MCNC: 1.4 MG/DL (ref 1.6–2.6)
MCH RBC QN AUTO: 28.2 PG (ref 26.6–33)
MCHC RBC AUTO-ENTMCNC: 33.3 G/DL (ref 31.5–35.7)
MCV RBC AUTO: 84.5 FL (ref 79–97)
MONOCYTES # BLD AUTO: 0.57 10*3/MM3 (ref 0.1–0.9)
MONOCYTES NFR BLD AUTO: 9.1 % (ref 5–12)
NEUTROPHILS NFR BLD AUTO: 4.73 10*3/MM3 (ref 1.7–7)
NEUTROPHILS NFR BLD AUTO: 75.2 % (ref 42.7–76)
NRBC BLD AUTO-RTO: 0 /100 WBC (ref 0–0.2)
PHOSPHATE SERPL-MCNC: 3 MG/DL (ref 2.5–4.5)
PLATELET # BLD AUTO: 152 10*3/MM3 (ref 140–450)
PMV BLD AUTO: 11.1 FL (ref 6–12)
POTASSIUM SERPL-SCNC: 3.4 MMOL/L (ref 3.5–5.2)
RBC # BLD AUTO: 4.01 10*6/MM3 (ref 4.14–5.8)
SARS-COV-2 N GENE RESP QL NAA+PROBE: NOT DETECTED
SODIUM SERPL-SCNC: 130 MMOL/L (ref 136–145)
WBC # BLD AUTO: 6.28 10*3/MM3 (ref 3.4–10.8)

## 2021-09-24 PROCEDURE — 80048 BASIC METABOLIC PNL TOTAL CA: CPT | Performed by: FAMILY MEDICINE

## 2021-09-24 PROCEDURE — 83735 ASSAY OF MAGNESIUM: CPT | Performed by: FAMILY MEDICINE

## 2021-09-24 PROCEDURE — 25010000002 CEFTRIAXONE PER 250 MG: Performed by: FAMILY MEDICINE

## 2021-09-24 PROCEDURE — 82962 GLUCOSE BLOOD TEST: CPT

## 2021-09-24 PROCEDURE — 85025 COMPLETE CBC W/AUTO DIFF WBC: CPT | Performed by: FAMILY MEDICINE

## 2021-09-24 PROCEDURE — 84100 ASSAY OF PHOSPHORUS: CPT | Performed by: FAMILY MEDICINE

## 2021-09-24 RX ORDER — POTASSIUM CHLORIDE 750 MG/1
20 CAPSULE, EXTENDED RELEASE ORAL ONCE
Status: COMPLETED | OUTPATIENT
Start: 2021-09-24 | End: 2021-09-24

## 2021-09-24 RX ADMIN — LEVOTHYROXINE SODIUM 25 MCG: 0.03 TABLET ORAL at 04:57

## 2021-09-24 RX ADMIN — SODIUM CHLORIDE, PRESERVATIVE FREE 10 ML: 5 INJECTION INTRAVENOUS at 10:02

## 2021-09-24 RX ADMIN — ACETAZOLAMIDE 500 MG: 250 TABLET ORAL at 10:00

## 2021-09-24 RX ADMIN — BENZTROPINE MESYLATE 1 MG: 1 TABLET ORAL at 09:59

## 2021-09-24 RX ADMIN — POTASSIUM CHLORIDE 20 MEQ: 10 CAPSULE, COATED, EXTENDED RELEASE ORAL at 10:00

## 2021-09-24 RX ADMIN — DULOXETINE HYDROCHLORIDE 60 MG: 30 CAPSULE, DELAYED RELEASE ORAL at 10:00

## 2021-09-24 RX ADMIN — PANTOPRAZOLE SODIUM 40 MG: 40 TABLET, DELAYED RELEASE ORAL at 04:57

## 2021-09-24 RX ADMIN — CEFTRIAXONE SODIUM 1 G: 1 INJECTION, SOLUTION INTRAVENOUS at 10:00

## 2021-09-24 RX ADMIN — SODIUM CHLORIDE, POTASSIUM CHLORIDE, SODIUM LACTATE AND CALCIUM CHLORIDE 100 ML/HR: 600; 310; 30; 20 INJECTION, SOLUTION INTRAVENOUS at 04:57

## 2021-09-24 RX ADMIN — GABAPENTIN 300 MG: 300 CAPSULE ORAL at 04:58

## 2021-09-24 RX ADMIN — DOCUSATE SODIUM 50MG AND SENNOSIDES 8.6MG 2 TABLET: 8.6; 5 TABLET, FILM COATED ORAL at 10:59

## 2021-09-26 LAB
BACTERIA SPEC AEROBE CULT: NORMAL
BACTERIA SPEC AEROBE CULT: NORMAL

## 2021-10-05 ENCOUNTER — APPOINTMENT (OUTPATIENT)
Dept: GENERAL RADIOLOGY | Facility: HOSPITAL | Age: 61
End: 2021-10-05

## 2021-10-05 ENCOUNTER — HOSPITAL ENCOUNTER (EMERGENCY)
Facility: HOSPITAL | Age: 61
Discharge: HOME | End: 2021-10-05
Attending: EMERGENCY MEDICINE | Admitting: EMERGENCY MEDICINE

## 2021-10-05 VITALS
HEART RATE: 92 BPM | BODY MASS INDEX: 53.41 KG/M2 | SYSTOLIC BLOOD PRESSURE: 121 MMHG | TEMPERATURE: 99.1 F | WEIGHT: 315 LBS | DIASTOLIC BLOOD PRESSURE: 91 MMHG | RESPIRATION RATE: 14 BRPM | OXYGEN SATURATION: 95 %

## 2021-10-05 DIAGNOSIS — R41.82 ALTERED MENTAL STATUS, UNSPECIFIED ALTERED MENTAL STATUS TYPE: Primary | ICD-10-CM

## 2021-10-05 DIAGNOSIS — T50.905A ADVERSE EFFECT OF DRUG, INITIAL ENCOUNTER: ICD-10-CM

## 2021-10-05 LAB
ALBUMIN SERPL-MCNC: 3.6 G/DL (ref 3.5–5.2)
ALBUMIN/GLOB SERPL: 1 G/DL
ALP SERPL-CCNC: 69 U/L (ref 39–117)
ALT SERPL W P-5'-P-CCNC: 7 U/L (ref 1–41)
AMMONIA BLD-SCNC: 26 UMOL/L (ref 16–60)
ANION GAP SERPL CALCULATED.3IONS-SCNC: 12.6 MMOL/L (ref 5–15)
AST SERPL-CCNC: 8 U/L (ref 1–40)
BACTERIA UR QL AUTO: ABNORMAL /HPF
BASOPHILS # BLD AUTO: 0 10*3/MM3 (ref 0–0.2)
BASOPHILS NFR BLD AUTO: 0 % (ref 0–1.5)
BILIRUB SERPL-MCNC: 0.3 MG/DL (ref 0–1.2)
BILIRUB UR QL STRIP: NEGATIVE
BUN SERPL-MCNC: 35 MG/DL (ref 6–20)
BUN/CREAT SERPL: 19.1 (ref 7–25)
CALCIUM SPEC-SCNC: 8.6 MG/DL (ref 8.6–10.5)
CHLORIDE SERPL-SCNC: 99 MMOL/L (ref 98–107)
CLARITY UR: ABNORMAL
CO2 SERPL-SCNC: 18.4 MMOL/L (ref 22–29)
COLOR UR: YELLOW
CREAT SERPL-MCNC: 1.83 MG/DL (ref 0.76–1.27)
D-LACTATE SERPL-SCNC: 1 MMOL/L (ref 0.5–2)
DEPRECATED RDW RBC AUTO: 46.7 FL (ref 37–54)
EOSINOPHIL # BLD AUTO: 0.17 10*3/MM3 (ref 0–0.4)
EOSINOPHIL NFR BLD AUTO: 2 % (ref 0.3–6.2)
ERYTHROCYTE [DISTWIDTH] IN BLOOD BY AUTOMATED COUNT: 14.9 % (ref 12.3–15.4)
GFR SERPL CREATININE-BSD FRML MDRD: 38 ML/MIN/1.73
GLOBULIN UR ELPH-MCNC: 3.6 GM/DL
GLUCOSE SERPL-MCNC: 131 MG/DL (ref 65–99)
GLUCOSE UR STRIP-MCNC: NEGATIVE MG/DL
HCT VFR BLD AUTO: 36.3 % (ref 37.5–51)
HGB BLD-MCNC: 11.5 G/DL (ref 13–17.7)
HGB UR QL STRIP.AUTO: ABNORMAL
HYALINE CASTS UR QL AUTO: ABNORMAL /LPF
IMM GRANULOCYTES # BLD AUTO: 0.02 10*3/MM3 (ref 0–0.05)
IMM GRANULOCYTES NFR BLD AUTO: 0.2 % (ref 0–0.5)
KETONES UR QL STRIP: ABNORMAL
LEUKOCYTE ESTERASE UR QL STRIP.AUTO: ABNORMAL
LYMPHOCYTES # BLD AUTO: 1.04 10*3/MM3 (ref 0.7–3.1)
LYMPHOCYTES NFR BLD AUTO: 12.1 % (ref 19.6–45.3)
MCH RBC QN AUTO: 27.4 PG (ref 26.6–33)
MCHC RBC AUTO-ENTMCNC: 31.7 G/DL (ref 31.5–35.7)
MCV RBC AUTO: 86.6 FL (ref 79–97)
MONOCYTES # BLD AUTO: 0.73 10*3/MM3 (ref 0.1–0.9)
MONOCYTES NFR BLD AUTO: 8.5 % (ref 5–12)
NEUTROPHILS NFR BLD AUTO: 6.62 10*3/MM3 (ref 1.7–7)
NEUTROPHILS NFR BLD AUTO: 77.2 % (ref 42.7–76)
NITRITE UR QL STRIP: NEGATIVE
NRBC BLD AUTO-RTO: 0 /100 WBC (ref 0–0.2)
PH UR STRIP.AUTO: <=5 [PH] (ref 5–8)
PLATELET # BLD AUTO: 150 10*3/MM3 (ref 140–450)
PMV BLD AUTO: 12 FL (ref 6–12)
POTASSIUM SERPL-SCNC: 4 MMOL/L (ref 3.5–5.2)
PROT SERPL-MCNC: 7.2 G/DL (ref 6–8.5)
PROT UR QL STRIP: ABNORMAL
QT INTERVAL: 323 MS
RBC # BLD AUTO: 4.19 10*6/MM3 (ref 4.14–5.8)
RBC # UR: ABNORMAL /HPF
REF LAB TEST METHOD: ABNORMAL
SODIUM SERPL-SCNC: 130 MMOL/L (ref 136–145)
SP GR UR STRIP: 1.02 (ref 1–1.03)
SQUAMOUS #/AREA URNS HPF: ABNORMAL /HPF
UROBILINOGEN UR QL STRIP: ABNORMAL
WBC # BLD AUTO: 8.58 10*3/MM3 (ref 3.4–10.8)
WBC UR QL AUTO: ABNORMAL /HPF

## 2021-10-05 PROCEDURE — 99211 OFF/OP EST MAY X REQ PHY/QHP: CPT

## 2021-10-05 PROCEDURE — 93010 ELECTROCARDIOGRAM REPORT: CPT | Performed by: INTERNAL MEDICINE

## 2021-10-05 PROCEDURE — 93005 ELECTROCARDIOGRAM TRACING: CPT | Performed by: EMERGENCY MEDICINE

## 2021-10-05 PROCEDURE — 83605 ASSAY OF LACTIC ACID: CPT | Performed by: EMERGENCY MEDICINE

## 2021-10-05 PROCEDURE — 82140 ASSAY OF AMMONIA: CPT | Performed by: EMERGENCY MEDICINE

## 2021-10-05 PROCEDURE — 81001 URINALYSIS AUTO W/SCOPE: CPT | Performed by: EMERGENCY MEDICINE

## 2021-10-05 PROCEDURE — 71045 X-RAY EXAM CHEST 1 VIEW: CPT

## 2021-10-05 PROCEDURE — 99284 EMERGENCY DEPT VISIT MOD MDM: CPT

## 2021-10-05 PROCEDURE — P9612 CATHETERIZE FOR URINE SPEC: HCPCS

## 2021-10-05 PROCEDURE — 80053 COMPREHEN METABOLIC PANEL: CPT | Performed by: EMERGENCY MEDICINE

## 2021-10-05 PROCEDURE — 85025 COMPLETE CBC W/AUTO DIFF WBC: CPT | Performed by: EMERGENCY MEDICINE

## 2021-10-05 PROCEDURE — 87086 URINE CULTURE/COLONY COUNT: CPT | Performed by: EMERGENCY MEDICINE

## 2021-10-05 RX ADMIN — SODIUM CHLORIDE 1000 ML: 9 INJECTION, SOLUTION INTRAVENOUS at 16:52

## 2021-10-05 NOTE — ED PROVIDER NOTES
Time: 1:13 PM EDT  Arrived by: EMS  Chief Complaint: AMS  History provided by: Patient  History is limited by: AMS    History of Present Illness:    Maynor Geiger is a 61 y.o. male who presents to the emergency department today with complaints of altered mental status. The patient was admitted recently with a similar complaint. Today, the patient was sent from nursing home with altered mental status however he is currently alert and oriented.  His last admission included altered mental status and treatment for possible UTI , but he has a vuong catheter and this raises the question of infection versus colonization.  Prior culture grew yeast..     He denies any fever, cough, vomiting, diarrhea, or chills. He denies any recent falls or injuries.  The patient has a medical history of diabetes, hypertension, GERD, and gastroparesis. The patient is a former smoker, but per triage defers alcohol or drug use history. There are no other acute complaints at this time.  He is primarily bed bound due to morbid obesity and deconditioning.      History provided by:  Patient  History limited by:  Mental status change   used: No    Altered Mental Status  Presenting symptoms: confusion    Severity:  Moderate  Most recent episode:  Today  Episode history:  Continuous  Duration:  1 day  Timing:  Constant  Progression:  Unchanged  Chronicity:  Recurrent  Context: nursing home resident    Context: not alcohol use and not drug use    Associated symptoms: no fever, no rash, no seizures and no vomiting            Similar Symptoms Previously: Yes.  Recently seen: The patient was admitted on 9/21/2021 with renal insufficiency.      Patient Care Team  Primary Care Provider: Deshaun Saucedo MD    Past Medical History:     No Known Allergies  Past Medical History:   Diagnosis Date   • Anxiety    • Arthritis    • Depression    • Diabetes mellitus (HCC)    • Disease of thyroid gland    • Gastroparesis    • GERD  (gastroesophageal reflux disease)    • Hypertension    • Insomnia    • Myopathy    • Obesity    • Preglaucoma      No past surgical history on file.  No family history on file.    Home Medications:  Prior to Admission medications    Medication Sig Start Date End Date Taking? Authorizing Provider   acetaZOLAMIDE (DIAMOX) 500 MG capsule Take 500 mg by mouth 2 (Two) Times a Day.    Criselda Garcia MD   amitriptyline (ELAVIL) 25 MG tablet Take 25 mg by mouth Every Night.    Criselda Garcia MD   benztropine (COGENTIN) 1 MG tablet Take 1 mg by mouth 2 (two) times a day.    Criselda Garcia MD   brimonidine-timolol (COMBIGAN) 0.2-0.5 % ophthalmic solution Administer 1 drop into the left eye Every 12 (Twelve) Hours.    Criselda Garcia MD   cyanocobalamin 1000 MCG/ML injection Inject 1,000 mcg into the appropriate muscle as directed by prescriber Every 7 (Seven) Days. On thursday    Criselda Garcia MD   dicyclomine (BENTYL) 10 MG capsule Take 10 mg by mouth 3 (Three) Times a Day Before Meals.    Criselda Garcia MD   diphenoxylate-atropine (LOMOTIL) 2.5-0.025 MG per tablet Take 2 tablets by mouth 4 (Four) Times a Day.    Criselda Garcia MD   dorzolamide (TRUSOPT) 2 % ophthalmic solution Administer 1 drop into the left eye 2 (two) times a day.    Criselda Garcia MD   dry mouth gel (BIOTENE ORALBALANCE) gel Apply 1 application to the mouth or throat Every 6 (Six) Hours As Needed for Dry Mouth.    Criselda Garcia MD   DULoxetine (CYMBALTA) 60 MG capsule Take 60 mg by mouth Daily.    Criselda Garcia MD   gabapentin (NEURONTIN) 800 MG tablet Take 800 mg by mouth 3 (Three) Times a Day.    Criselda Garcia MD   ibuprofen (ADVIL,MOTRIN) 800 MG tablet Take 800 mg by mouth Every 6 (Six) Hours As Needed for Mild Pain .    Criselda Garcia MD   lactulose (CEPHULAC) 10 g packet Take 30 mL by mouth Daily As Needed for Constipation.    Criselda Garcia MD    levothyroxine (SYNTHROID, LEVOTHROID) 25 MCG tablet Take 25 mcg by mouth Every Morning.    Criselda Garcia MD   Lidocaine 5 % cream Apply 1 application topically to the appropriate area as directed At Night As Needed (Bilateral feet and ankle pain).    Criselda Garcia MD   lisinopril (PRINIVIL,ZESTRIL) 5 MG tablet Take 5 mg by mouth Daily.    Criselda Garcia MD   loperamide (Imodium A-D) 2 MG capsule Take 2 mg by mouth As Needed for Diarrhea.    Criselda Garcia MD   melatonin 5 MG tablet tablet Take 10 mg by mouth Every Night.    Criselda Garcia MD   metFORMIN (GLUCOPHAGE) 1000 MG tablet Take 1,000 mg by mouth 2 (two) times a day.    Criselda Garcia MD   methenamine (HIPREX) 1 g tablet Take 1 g by mouth 2 (Two) Times a Day With Meals.    Criselda Garcia MD   miconazole (MICOTIN) 2 % cream Apply 1 application topically to the appropriate area as directed 2 (Two) Times a Day As Needed (Skin Irritation).    Criselda Garcia MD   Netarsudil-Latanoprost (Rocklatan) 0.02-0.005 % solution Administer 1 drop into the left eye Every Night.    Criselda Garcia MD   nystatin (MYCOSTATIN) 834239 UNIT/GM powder Apply 1 application topically to the appropriate area as directed 2 (Two) Times a Day.    Criselda Garcia MD   ondansetron (ZOFRAN) 4 MG tablet Take 4 mg by mouth Every 6 (Six) Hours As Needed for Nausea or Vomiting.    Criselda Garcia MD   pantoprazole (PROTONIX) 20 MG EC tablet Take 20 mg by mouth Daily.    Criselda Garcia MD   Phenol-Glycerin (Chloraseptic Max Sore Throat) 1.5-33 % liquid Apply 2 application to the mouth or throat 3 (Three) Times a Day As Needed (Sore throat).    Criselda Garcia MD   potassium chloride (K-DUR,KLOR-CON) 20 MEQ CR tablet Take 20 mEq by mouth Daily.    Criselda Garcia MD   tamsulosin (FLOMAX) 0.4 MG capsule 24 hr capsule Take 0.4 mg by mouth Every Night.    Criselda Garcia MD        Social History:    PT  reports that he has quit smoking. He does not have any smokeless tobacco history on file. Alcohol use questions deferred to the physician. Drug use questions deferred to the physician.    Record Review:  I have reviewed the patient's records in [facility].     Review of Systems  Review of Systems   Constitutional: Negative for appetite change, chills and fever.   HENT: Negative for nosebleeds.    Eyes: Negative for redness.   Respiratory: Negative for cough and shortness of breath.    Cardiovascular: Negative for chest pain.   Gastrointestinal: Negative for diarrhea and vomiting.   Genitourinary: Negative for dysuria and frequency.   Musculoskeletal: Negative for back pain and neck pain.   Skin: Negative for rash.   Neurological: Negative for seizures.   Psychiatric/Behavioral: Positive for confusion.   All other systems reviewed and are negative.       Physical Exam  /91   Pulse 92   Temp 99.1 °F (37.3 °C)   Resp 14   Wt (!) 150 kg (330 lb 14.6 oz)   SpO2 95%   BMI 53.41 kg/m²     Physical Exam  Vitals and nursing note reviewed.   Constitutional:       General: He is not in acute distress.     Appearance: He is obese. He is not toxic-appearing or diaphoretic.   HENT:      Head: Normocephalic and atraumatic.      Nose: Nose normal.      Mouth/Throat:      Mouth: Mucous membranes are moist.   Eyes:      General: No scleral icterus.  Cardiovascular:      Rate and Rhythm: Normal rate and regular rhythm.      Heart sounds: Normal heart sounds. No murmur heard.     Pulmonary:      Effort: No respiratory distress.      Breath sounds: Normal breath sounds.   Abdominal:      General: Bowel sounds are normal.      Palpations: Abdomen is soft.      Tenderness: There is no abdominal tenderness.   Musculoskeletal:         General: No tenderness. Normal range of motion.      Cervical back: Normal range of motion and neck supple. No tenderness.      Right lower leg: No edema.      Left lower leg: No edema.   Skin:      General: Skin is warm.   Neurological:      Mental Status: He is alert and oriented to person, place, and time.      Sensory: No sensory deficit.      Motor: Weakness (generalized; no focal weakness) present.      Comments: No focal deficits. He is disoriented, as he did not recall that he came from the nursing home today.   Psychiatric:         Mood and Affect: Mood normal.         Speech: Speech normal.         Behavior: Behavior normal.                  ED Course  /91   Pulse 92   Temp 99.1 °F (37.3 °C)   Resp 14   Wt (!) 150 kg (330 lb 14.6 oz)   SpO2 95%   BMI 53.41 kg/m²   Results for orders placed or performed during the hospital encounter of 10/05/21   Urine Culture - Urine, Urine, Catheter    Specimen: Urine, Catheter   Result Value Ref Range    Urine Culture No growth    Comprehensive Metabolic Panel    Specimen: Blood   Result Value Ref Range    Glucose 131 (H) 65 - 99 mg/dL    BUN 35 (H) 6 - 20 mg/dL    Creatinine 1.83 (H) 0.76 - 1.27 mg/dL    Sodium 130 (L) 136 - 145 mmol/L    Potassium 4.0 3.5 - 5.2 mmol/L    Chloride 99 98 - 107 mmol/L    CO2 18.4 (L) 22.0 - 29.0 mmol/L    Calcium 8.6 8.6 - 10.5 mg/dL    Total Protein 7.2 6.0 - 8.5 g/dL    Albumin 3.60 3.50 - 5.20 g/dL    ALT (SGPT) 7 1 - 41 U/L    AST (SGOT) 8 1 - 40 U/L    Alkaline Phosphatase 69 39 - 117 U/L    Total Bilirubin 0.3 0.0 - 1.2 mg/dL    eGFR Non African Amer 38 (L) >60 mL/min/1.73    Globulin 3.6 gm/dL    A/G Ratio 1.0 g/dL    BUN/Creatinine Ratio 19.1 7.0 - 25.0    Anion Gap 12.6 5.0 - 15.0 mmol/L   Lactic Acid, Plasma    Specimen: Blood   Result Value Ref Range    Lactate 1.0 0.5 - 2.0 mmol/L   CBC Auto Differential    Specimen: Blood   Result Value Ref Range    WBC 8.58 3.40 - 10.80 10*3/mm3    RBC 4.19 4.14 - 5.80 10*6/mm3    Hemoglobin 11.5 (L) 13.0 - 17.7 g/dL    Hematocrit 36.3 (L) 37.5 - 51.0 %    MCV 86.6 79.0 - 97.0 fL    MCH 27.4 26.6 - 33.0 pg    MCHC 31.7 31.5 - 35.7 g/dL    RDW 14.9 12.3 - 15.4 %    RDW-SD  46.7 37.0 - 54.0 fl    MPV 12.0 6.0 - 12.0 fL    Platelets 150 140 - 450 10*3/mm3    Neutrophil % 77.2 (H) 42.7 - 76.0 %    Lymphocyte % 12.1 (L) 19.6 - 45.3 %    Monocyte % 8.5 5.0 - 12.0 %    Eosinophil % 2.0 0.3 - 6.2 %    Basophil % 0.0 0.0 - 1.5 %    Immature Grans % 0.2 0.0 - 0.5 %    Neutrophils, Absolute 6.62 1.70 - 7.00 10*3/mm3    Lymphocytes, Absolute 1.04 0.70 - 3.10 10*3/mm3    Monocytes, Absolute 0.73 0.10 - 0.90 10*3/mm3    Eosinophils, Absolute 0.17 0.00 - 0.40 10*3/mm3    Basophils, Absolute 0.00 0.00 - 0.20 10*3/mm3    Immature Grans, Absolute 0.02 0.00 - 0.05 10*3/mm3    nRBC 0.0 0.0 - 0.2 /100 WBC   Ammonia    Specimen: Blood   Result Value Ref Range    Ammonia 26 16 - 60 umol/L   Urinalysis With Culture If Indicated - Urine, Catheter    Specimen: Urine, Catheter   Result Value Ref Range    Color, UA Yellow Yellow, Straw    Appearance, UA Cloudy (A) Clear    pH, UA <=5.0 5.0 - 8.0    Specific Gravity, UA 1.016 1.005 - 1.030    Glucose, UA Negative Negative    Ketones, UA Trace (A) Negative    Bilirubin, UA Negative Negative    Blood, UA Small (1+) (A) Negative    Protein, UA 30 mg/dL (1+) (A) Negative    Leuk Esterase, UA Moderate (2+) (A) Negative    Nitrite, UA Negative Negative    Urobilinogen, UA 0.2 E.U./dL 0.2 - 1.0 E.U./dL   Urinalysis, Microscopic Only - Urine, Catheter    Specimen: Urine, Catheter   Result Value Ref Range    RBC, UA 3-5 (A) None Seen /HPF    WBC, UA Too Numerous to Count (A) None Seen /HPF    Bacteria, UA None Seen None Seen /HPF    Squamous Epithelial Cells, UA 0-2 None Seen, 0-2 /HPF    Hyaline Casts, UA 3-6 None Seen /LPF    Methodology Manual Light Microscopy    ECG 12 Lead   Result Value Ref Range    QT Interval 323 ms     Medications   sodium chloride 0.9 % bolus 1,000 mL (0 mL Intravenous Stopped 10/5/21 1836)     CT Head Without Contrast    Result Date: 10/6/2021  PROCEDURE: CT HEAD WO CONTRAST  COMPARISON:  [facility], CT, CT HEAD WO CONTRAST,  9/21/2021, 12:13.  INDICATIONS: Altered mental status  PROTOCOL:   Standard imaging protocol performed    RADIATION:   DLP: 1145.2mGy*cm   MA and/or KV was adjusted to minimize radiation dose.    TECHNIQUE: CT images were obtained without non-ionic intravenous contrast material.  FINDINGS:  The ventricles are normal in size, position, and configuration.  Cavum septum pellucidum is of no clinical significance.  Sulci are not abnormally prominent.  No abnormal gray or white matter density is evident.  There is no CT evidence of acute intracranial hemorrhage, mass, or mass effect.  The orbits have a normal appearance.  The paranasal sinuses, middle ears, and mastoid air cells are well aerated.  CONCLUSION:  Negative CT scan of the head without IV contrast.     JOANN BRYSON MD       Electronically Signed and Approved By: JOANN BRYSON MD on 10/06/2021 at 19:54             XR Chest 1 View    Result Date: 10/6/2021  PROCEDURE: XR CHEST 1 VW  COMPARISON: [facility], , XR CHEST 1 VW, 10/05/2021, 14:01.  INDICATIONS: Weak/Dizzy/AMS triage protocol  FINDINGS:  The heart remains slightly enlarged.  The pulmonary vascularity does not appear congested.  The lungs are well-expanded and free of infiltrates.  Surgical clips are again seen in the mediastinum.  Bony structures appear intact.  CONCLUSION:  Cardiomegaly, unchanged.  No active pulmonary disease is seen.       JOANN BRYSON MD       Electronically Signed and Approved By: JOANN BRYSON MD on 10/06/2021 at 18:40                   Procedures/EKGs:  Procedures     EKG:    Rhythm: Normal sinus rhythm.  Rate: 93.  First degree AV block.  Normal P waves.  Normal QRS.  Normal axis.  Nonspecific ST changes.  Normal QT/QTc.    EKG Comparison: N/A.    Interpreted by me.        Medical Decision Making:                     MDM  Number of Diagnoses or Management Options  Adverse effect of drug, initial encounter  Altered mental status, unspecified altered mental  status type  Diagnosis management comments: On repeat examination the patient is alert and oriented x3 and is at his baseline.  The patient has mild azotemia and otherwise his labs reveal no acute findings.  His urine grew out yeast only the last time he was in the hospital.  We will wait for urine culture to come back.  I suspect that this patient's intermittent episodes of altered mental status are due to the many medications that he is currently taking.       Amount and/or Complexity of Data Reviewed  Clinical lab tests: reviewed  Tests in the radiology section of CPT®: reviewed  Tests in the medicine section of CPT®: reviewed  Decide to obtain previous medical records or to obtain history from someone other than the patient: yes  Obtain history from someone other than the patient: yes  Review and summarize past medical records: yes  Independent visualization of images, tracings, or specimens: yes    Patient Progress  Patient progress: improved       Final diagnoses:   Altered mental status, unspecified altered mental status type   Adverse effect of drug, initial encounter        Disposition:  ED Disposition     ED Disposition Condition Comment    Discharge Stable Patient has intermittent confusion. Patient is returning with vuong in place from nursing home that patient is in,           Documentation assistance provided by Linnette George acting as scribe for Dr. Oz Anne. Information recorded by the scribe was done at my direction and has been verified and validated by me.      Linnette George  10/05/21 1314       Linnette George  10/05/21 1349       Bill Georgehel  10/05/21 1432       Ariel Linnette  10/05/21 1435       Bill Georgehel  10/05/21 1436       Ariel Linnette  10/05/21 1518       Bill Georgehel  10/05/21 1633       Ariel Linnette  10/05/21 1651       Oz Anne,   10/07/21 1049

## 2021-10-06 ENCOUNTER — APPOINTMENT (OUTPATIENT)
Dept: CT IMAGING | Facility: HOSPITAL | Age: 61
End: 2021-10-06

## 2021-10-06 ENCOUNTER — HOSPITAL ENCOUNTER (EMERGENCY)
Facility: HOSPITAL | Age: 61
Discharge: NURSING, REHAB OR LONG-TERM CARE | End: 2021-10-07
Attending: EMERGENCY MEDICINE | Admitting: EMERGENCY MEDICINE

## 2021-10-06 ENCOUNTER — APPOINTMENT (OUTPATIENT)
Dept: GENERAL RADIOLOGY | Facility: HOSPITAL | Age: 61
End: 2021-10-06

## 2021-10-06 DIAGNOSIS — T50.905A ADVERSE EFFECT OF DRUG, INITIAL ENCOUNTER: ICD-10-CM

## 2021-10-06 DIAGNOSIS — R41.82 ALTERED MENTAL STATUS, UNSPECIFIED ALTERED MENTAL STATUS TYPE: Primary | ICD-10-CM

## 2021-10-06 LAB
ALBUMIN SERPL-MCNC: 3.8 G/DL (ref 3.5–5.2)
ALBUMIN/GLOB SERPL: 1 G/DL
ALP SERPL-CCNC: 70 U/L (ref 39–117)
ALT SERPL W P-5'-P-CCNC: 8 U/L (ref 1–41)
ANION GAP SERPL CALCULATED.3IONS-SCNC: 12.6 MMOL/L (ref 5–15)
ARTERIAL PATENCY WRIST A: POSITIVE
AST SERPL-CCNC: 9 U/L (ref 1–40)
BACTERIA SPEC AEROBE CULT: NO GROWTH
BACTERIA UR QL AUTO: ABNORMAL /HPF
BASE EXCESS BLDA CALC-SCNC: -8.9 MMOL/L (ref -2–2)
BASOPHILS # BLD AUTO: 0 10*3/MM3 (ref 0–0.2)
BASOPHILS NFR BLD AUTO: 0 % (ref 0–1.5)
BDY SITE: ABNORMAL
BILIRUB SERPL-MCNC: 0.3 MG/DL (ref 0–1.2)
BILIRUB UR QL STRIP: NEGATIVE
BUN SERPL-MCNC: 26 MG/DL (ref 6–20)
BUN/CREAT SERPL: 22.2 (ref 7–25)
CALCIUM SPEC-SCNC: 8.9 MG/DL (ref 8.6–10.5)
CHLORIDE SERPL-SCNC: 105 MMOL/L (ref 98–107)
CLARITY UR: ABNORMAL
CO2 SERPL-SCNC: 18.4 MMOL/L (ref 22–29)
COHGB MFR BLD: 0.8 % (ref 0–1.5)
COLOR UR: YELLOW
CREAT SERPL-MCNC: 1.17 MG/DL (ref 0.76–1.27)
D-LACTATE SERPL-SCNC: 0.8 MMOL/L (ref 0.5–2)
DEPRECATED RDW RBC AUTO: 47.5 FL (ref 37–54)
EOSINOPHIL # BLD AUTO: 0.15 10*3/MM3 (ref 0–0.4)
EOSINOPHIL NFR BLD AUTO: 2.2 % (ref 0.3–6.2)
ERYTHROCYTE [DISTWIDTH] IN BLOOD BY AUTOMATED COUNT: 15.4 % (ref 12.3–15.4)
FHHB: 3.7 % (ref 0–5)
GFR SERPL CREATININE-BSD FRML MDRD: 64 ML/MIN/1.73
GLOBULIN UR ELPH-MCNC: 3.8 GM/DL
GLUCOSE SERPL-MCNC: 117 MG/DL (ref 65–99)
GLUCOSE UR STRIP-MCNC: NEGATIVE MG/DL
HCO3 BLDA-SCNC: 17.5 MMOL/L (ref 22–26)
HCT VFR BLD AUTO: 37.3 % (ref 37.5–51)
HGB BLD-MCNC: 12.1 G/DL (ref 13–17.7)
HGB BLDA-MCNC: 12.7 G/DL (ref 13.8–16.4)
HGB UR QL STRIP.AUTO: ABNORMAL
HOLD SPECIMEN: NORMAL
HOLD SPECIMEN: NORMAL
HYALINE CASTS UR QL AUTO: ABNORMAL /LPF
IMM GRANULOCYTES # BLD AUTO: 0.01 10*3/MM3 (ref 0–0.05)
IMM GRANULOCYTES NFR BLD AUTO: 0.1 % (ref 0–0.5)
INHALED O2 CONCENTRATION: 21 %
KETONES UR QL STRIP: ABNORMAL
LEUKOCYTE ESTERASE UR QL STRIP.AUTO: ABNORMAL
LYMPHOCYTES # BLD AUTO: 1.14 10*3/MM3 (ref 0.7–3.1)
LYMPHOCYTES NFR BLD AUTO: 16.7 % (ref 19.6–45.3)
MAGNESIUM SERPL-MCNC: 1.7 MG/DL (ref 1.6–2.6)
MCH RBC QN AUTO: 27.9 PG (ref 26.6–33)
MCHC RBC AUTO-ENTMCNC: 32.4 G/DL (ref 31.5–35.7)
MCV RBC AUTO: 85.9 FL (ref 79–97)
METHGB BLD QL: 0.2 % (ref 0–1.5)
MODALITY: ABNORMAL
MONOCYTES # BLD AUTO: 0.67 10*3/MM3 (ref 0.1–0.9)
MONOCYTES NFR BLD AUTO: 9.8 % (ref 5–12)
MUCOUS THREADS URNS QL MICRO: ABNORMAL /HPF
NEUTROPHILS NFR BLD AUTO: 4.85 10*3/MM3 (ref 1.7–7)
NEUTROPHILS NFR BLD AUTO: 71.2 % (ref 42.7–76)
NITRITE UR QL STRIP: NEGATIVE
NRBC BLD AUTO-RTO: 0 /100 WBC (ref 0–0.2)
OXYHGB MFR BLDV: 95.3 % (ref 94–99)
PCO2 BLDA: 39.2 MM HG (ref 35–45)
PH BLDA: 7.27 PH UNITS (ref 7.35–7.45)
PH UR STRIP.AUTO: 5.5 [PH] (ref 5–8)
PLATELET # BLD AUTO: 149 10*3/MM3 (ref 140–450)
PMV BLD AUTO: 11.1 FL (ref 6–12)
PO2 BLD: 456 MM[HG] (ref 0–500)
PO2 BLDA: 95.8 MM HG (ref 80–100)
POTASSIUM SERPL-SCNC: 4.5 MMOL/L (ref 3.5–5.2)
PROT SERPL-MCNC: 7.6 G/DL (ref 6–8.5)
PROT UR QL STRIP: ABNORMAL
RBC # BLD AUTO: 4.34 10*6/MM3 (ref 4.14–5.8)
RBC # UR: ABNORMAL /HPF
REF LAB TEST METHOD: ABNORMAL
SAO2 % BLDCOA: 96.3 % (ref 95–99)
SODIUM SERPL-SCNC: 136 MMOL/L (ref 136–145)
SP GR UR STRIP: 1.02 (ref 1–1.03)
SQUAMOUS #/AREA URNS HPF: ABNORMAL /HPF
TROPONIN T SERPL-MCNC: <0.01 NG/ML (ref 0–0.03)
UROBILINOGEN UR QL STRIP: ABNORMAL
WBC # BLD AUTO: 6.82 10*3/MM3 (ref 3.4–10.8)
WBC UR QL AUTO: ABNORMAL /HPF
WHOLE BLOOD HOLD SPECIMEN: NORMAL
WHOLE BLOOD HOLD SPECIMEN: NORMAL
YEAST URNS QL MICRO: ABNORMAL /HPF

## 2021-10-06 PROCEDURE — 81001 URINALYSIS AUTO W/SCOPE: CPT | Performed by: EMERGENCY MEDICINE

## 2021-10-06 PROCEDURE — 85025 COMPLETE CBC W/AUTO DIFF WBC: CPT | Performed by: EMERGENCY MEDICINE

## 2021-10-06 PROCEDURE — 83050 HGB METHEMOGLOBIN QUAN: CPT | Performed by: EMERGENCY MEDICINE

## 2021-10-06 PROCEDURE — 84484 ASSAY OF TROPONIN QUANT: CPT | Performed by: EMERGENCY MEDICINE

## 2021-10-06 PROCEDURE — 71045 X-RAY EXAM CHEST 1 VIEW: CPT

## 2021-10-06 PROCEDURE — 93010 ELECTROCARDIOGRAM REPORT: CPT | Performed by: INTERNAL MEDICINE

## 2021-10-06 PROCEDURE — 83735 ASSAY OF MAGNESIUM: CPT | Performed by: EMERGENCY MEDICINE

## 2021-10-06 PROCEDURE — 99284 EMERGENCY DEPT VISIT MOD MDM: CPT

## 2021-10-06 PROCEDURE — 87040 BLOOD CULTURE FOR BACTERIA: CPT | Performed by: EMERGENCY MEDICINE

## 2021-10-06 PROCEDURE — 82375 ASSAY CARBOXYHB QUANT: CPT | Performed by: EMERGENCY MEDICINE

## 2021-10-06 PROCEDURE — 87086 URINE CULTURE/COLONY COUNT: CPT | Performed by: EMERGENCY MEDICINE

## 2021-10-06 PROCEDURE — 70450 CT HEAD/BRAIN W/O DYE: CPT

## 2021-10-06 PROCEDURE — 93005 ELECTROCARDIOGRAM TRACING: CPT | Performed by: EMERGENCY MEDICINE

## 2021-10-06 PROCEDURE — 83605 ASSAY OF LACTIC ACID: CPT | Performed by: EMERGENCY MEDICINE

## 2021-10-06 PROCEDURE — 80053 COMPREHEN METABOLIC PANEL: CPT | Performed by: EMERGENCY MEDICINE

## 2021-10-06 PROCEDURE — 82805 BLOOD GASES W/O2 SATURATION: CPT | Performed by: EMERGENCY MEDICINE

## 2021-10-06 PROCEDURE — 36600 WITHDRAWAL OF ARTERIAL BLOOD: CPT | Performed by: EMERGENCY MEDICINE

## 2021-10-06 RX ORDER — SODIUM CHLORIDE 0.9 % (FLUSH) 0.9 %
10 SYRINGE (ML) INJECTION AS NEEDED
Status: DISCONTINUED | OUTPATIENT
Start: 2021-10-06 | End: 2021-10-07 | Stop reason: HOSPADM

## 2021-10-06 NOTE — ED PROVIDER NOTES
Subjective   This patient presents to the emergency department once again from a nursing facility.  The patient was seen yesterday for altered mental status however after an extensive work-up and reexamination the patient was alert and oriented x3 and he was at his baseline.  The nurse who is currently caring for him states that she is also seen him numerous times and he has had altered mental status on previous visits to the emergency department.  Today the patient was sent back because there is been an alteration in his mental status according to the nursing facility.  He has no complaints currently.  He is alert slightly sedated and he is oriented to place.  The patient denies any pain he has had no recent falls he has had no fever chills or cough.  He had no vomiting or diarrhea.  The patient is not mobile and has a Walter catheter in place.          Review of Systems   HENT: Negative.    Eyes: Negative.    Respiratory: Negative.    Cardiovascular: Negative.    Gastrointestinal: Negative.    Endocrine: Negative.    Genitourinary: Negative.    Musculoskeletal: Negative.    Skin: Negative.    Allergic/Immunologic: Negative.    Neurological: Positive for weakness.   Hematological: Negative.    Psychiatric/Behavioral: Positive for confusion. Negative for agitation, behavioral problems, decreased concentration, dysphoric mood, hallucinations, self-injury, sleep disturbance and suicidal ideas. The patient is not nervous/anxious and is not hyperactive.        Past Medical History:   Diagnosis Date   • Anxiety    • Arthritis    • Depression    • Diabetes mellitus (HCC)    • Disease of thyroid gland    • Gastroparesis    • GERD (gastroesophageal reflux disease)    • Hypertension    • Insomnia    • Myopathy    • Obesity    • Preglaucoma        No Known Allergies    History reviewed. No pertinent surgical history.    History reviewed. No pertinent family history.    Social History     Socioeconomic History   • Marital  status:      Spouse name: Not on file   • Number of children: Not on file   • Years of education: Not on file   • Highest education level: Not on file   Tobacco Use   • Smoking status: Former Smoker   • Tobacco comment: unable to assess - told me former smoker   Substance and Sexual Activity   • Alcohol use: Defer   • Drug use: Defer   • Sexual activity: Defer           Objective   Physical Exam  Constitutional:       Appearance: He is obese.   HENT:      Head: Normocephalic and atraumatic.      Mouth/Throat:      Mouth: Mucous membranes are moist.      Pharynx: Oropharynx is clear.   Eyes:      General: No visual field deficit.     Extraocular Movements: Extraocular movements intact.      Pupils: Pupils are equal, round, and reactive to light.   Cardiovascular:      Rate and Rhythm: Normal rate and regular rhythm.      Heart sounds: Normal heart sounds.   Pulmonary:      Effort: Pulmonary effort is normal.      Breath sounds: Normal breath sounds.   Abdominal:      General: Bowel sounds are normal.      Palpations: Abdomen is soft.      Comments: There is a gastrostomy tube placed in the epigastrium   Musculoskeletal:         General: Normal range of motion.      Cervical back: Normal range of motion and neck supple.   Skin:     General: Skin is warm and dry.      Capillary Refill: Capillary refill takes less than 2 seconds.   Neurological:      Mental Status: He is alert. He is confused.      Cranial Nerves: No cranial nerve deficit, dysarthria or facial asymmetry.      Sensory: No sensory deficit.      Coordination: Romberg sign negative.      Gait: Gait abnormal.      Comments: The patient has chronic generalized weakness he is nonambulatory and he has a Walter catheter in place.   Psychiatric:         Mood and Affect: Mood normal.         Speech: Speech normal.         Procedures           ED Course    Labs Reviewed   COMPREHENSIVE METABOLIC PANEL - Abnormal; Notable for the following components:        Result Value    Glucose 117 (*)     BUN 26 (*)     CO2 18.4 (*)     All other components within normal limits    Narrative:     GFR Normal >60  Chronic Kidney Disease <60  Kidney Failure <15     URINALYSIS W/ CULTURE IF INDICATED - Abnormal; Notable for the following components:    Appearance, UA Cloudy (*)     Ketones, UA Trace (*)     Blood, UA Small (1+) (*)     Protein, UA 30 mg/dL (1+) (*)     Leuk Esterase, UA Large (3+) (*)     All other components within normal limits   CBC WITH AUTO DIFFERENTIAL - Abnormal; Notable for the following components:    Hemoglobin 12.1 (*)     Hematocrit 37.3 (*)     Lymphocyte % 16.7 (*)     All other components within normal limits   BLOOD GAS, ARTERIAL - Abnormal; Notable for the following components:    pH, Arterial 7.267 (*)     HCO3, Arterial 17.5 (*)     Base Excess, Arterial -8.9 (*)     Hemoglobin, Blood Gas 12.7 (*)     All other components within normal limits   URINALYSIS, MICROSCOPIC ONLY - Abnormal; Notable for the following components:    RBC, UA 3-5 (*)     WBC, UA 31-50 (*)     Bacteria, UA Trace (*)     Mucus, UA Small/1+ (*)     All other components within normal limits   TROPONIN (IN-HOUSE) - Normal    Narrative:     Troponin T Reference Range:  <= 0.03 ng/mL-   Negative for AMI  >0.03 ng/mL-     Abnormal for myocardial necrosis.  Clinicians would have to utilize clinical acumen, EKG, Troponin and serial changes to determine if it is an Acute Myocardial Infarction or myocardial injury due to an underlying chronic condition.       Results may be falsely decreased if patient taking Biotin.     MAGNESIUM - Normal   LACTIC ACID, PLASMA - Normal   BLOOD CULTURE   BLOOD CULTURE   URINE CULTURE   RAINBOW DRAW    Narrative:     The following orders were created for panel order [place] Draw.  Procedure                               Abnormality         Status                     ---------                               -----------         ------                     Green  Top (Gel)[[id number]]                                  Final result               Lavender Top[[id number]]                                     Final result               Gold Top - SST[[id number]]                                   Final result               Light Blue Top[[id number]]                                   Final result                 Please view results for these tests on the individual orders.   POCT GLUCOSE FINGERSTICK   CBC AND DIFFERENTIAL    Narrative:     The following orders were created for panel order CBC & Differential.  Procedure                               Abnormality         Status                     ---------                               -----------         ------                     CBC Auto Differential[[id number]]        Abnormal            Final result                 Please view results for these tests on the individual orders.   GREEN TOP   LAVENDER TOP   GOLD TOP - SST   LIGHT BLUE TOP       CT Head Without Contrast    Result Date: 10/6/2021  Narrative: PROCEDURE: CT HEAD WO CONTRAST  COMPARISON:  [facility], CT, CT HEAD WO CONTRAST, 9/21/2021, 12:13.  INDICATIONS: Altered mental status  PROTOCOL:   Standard imaging protocol performed    RADIATION:   DLP: 1145.2mGy*cm   MA and/or KV was adjusted to minimize radiation dose.    TECHNIQUE: CT images were obtained without non-ionic intravenous contrast material.  FINDINGS:  The ventricles are normal in size, position, and configuration.  Cavum septum pellucidum is of no clinical significance.  Sulci are not abnormally prominent.  No abnormal gray or white matter density is evident.  There is no CT evidence of acute intracranial hemorrhage, mass, or mass effect.  The orbits have a normal appearance.  The paranasal sinuses, middle ears, and mastoid air cells are well aerated.  CONCLUSION:  Negative CT scan of the head without IV contrast.     JOANN BRYSON MD       Electronically Signed and Approved By: JOANN BRYSON MD on  10/06/2021 at 19:54             CT Head Without Contrast    Result Date: 9/21/2021  Narrative: PROCEDURE: CT HEAD WO CONTRAST  COMPARISON:  None INDICATIONS: Altered mental status  PROTOCOL:   Standard imaging protocol performed    RADIATION:      MA and/or KV was adjusted to minimize radiation dose.     TECHNIQUE: After obtaining the patient's consent, CT images were obtained without non-ionic intravenous contrast material.  FINDINGS:  There is an incidental cavum septum pellucidum.  There is no acute hemorrhage.  There are no abnormal extra-axial fluid collections.  There is no mass or mass effect.  The osseous structures are normal.  CONCLUSION: No acute disease.      WILMAR ALVAREZ MD       Electronically Signed and Approved By: WILMAR ALVAREZ MD on 9/21/2021 at 12:20             XR Chest 1 View    Result Date: 10/6/2021  Narrative: PROCEDURE: XR CHEST 1 VW  COMPARISON: [facility], , XR CHEST 1 VW, 10/05/2021, 14:01.  INDICATIONS: Weak/Dizzy/AMS triage protocol  FINDINGS:  The heart remains slightly enlarged.  The pulmonary vascularity does not appear congested.  The lungs are well-expanded and free of infiltrates.  Surgical clips are again seen in the mediastinum.  Bony structures appear intact.  CONCLUSION:  Cardiomegaly, unchanged.  No active pulmonary disease is seen.       JOANN BRYSON MD       Electronically Signed and Approved By: JOANN BRYSON MD on 10/06/2021 at 18:40             XR Chest 1 View    Result Date: 10/5/2021  Narrative: PROCEDURE: XR CHEST 1 VW  COMPARISON: [facility], , XR CHEST 1 VW, 9/21/2021, 11:14.  INDICATIONS: weakness/ams  FINDINGS:  There is a mild left basilar opacity.  The heart is enlarged.  The pulmonary vasculature appears normal.  The osseous structures appear intact.  Surgical clips are noted within the mediastinum.  CONCLUSION:  1. Mild left basilar opacity, which could reflect atelectasis or pneumonia. 2. Cardiomegaly.       TRA CAN  MD       Electronically Signed and Approved By: TRA CAN MD on 10/05/2021 at 14:47             XR Chest 1 View    Result Date: 9/21/2021  Narrative: PROCEDURE: XR CHEST 1 VW  COMPARISON: [facility], CT, CHEST W/ CONTRAST, 2/27/2020, 1:40.  [facility], CR, CHEST AP/PA 1 VIEW, 2/27/2020, 0:05.  INDICATIONS: ALTERED MENTAL STATUS, WEAKNESS.  FINDINGS:   The lungs are well-expanded. The heart and pulmonary vasculature are within normal limits. No pleural effusions are identified. There are no active appearing infiltrates.  Surgical clips are present in the mediastinum.  IMPRESSION: No active disease.  SRAVANTHI RANGEL MD       Electronically Signed and Approved By: SRAVANTHI RANGEL MD on 9/21/2021 at 11:42                                                    MDM  Number of Diagnoses or Management Options  Adverse effect of drug, initial encounter  Altered mental status, unspecified altered mental status type  Diagnosis management comments: Once again this patient presents form the NH with a history of altered mental  status.  This is his second visit in 2  Days and his 3rd in the past two weeks. He was admitted recently for the same symptoms and it was felt that this was a metabolic issue and possible UTI but his urine culture only grew out yeast. Today the patient is again alert and oriented upon arrival.  His extensive Ed workup reveals no emergent medical issue and the patient requests return to the ED.  It appears once again that if the patient is having any alterations in mental status it is due to the numerous sedating medications that he is taking.  This will need to be addressed by the NH physician.        Amount and/or Complexity of Data Reviewed  Clinical lab tests: reviewed  Tests in the radiology section of CPT®: reviewed  Tests in the medicine section of CPT®: reviewed  Decide to obtain previous medical records or to obtain history from someone other than the patient:  yes  Obtain history from someone other than the patient: yes  Review and summarize past medical records: yes  Independent visualization of images, tracings, or specimens: yes    Patient Progress  Patient progress: stable      Final diagnoses:   Altered mental status, unspecified altered mental status type   Adverse effect of drug, initial encounter       ED Disposition  ED Disposition     ED Disposition Condition Comment    Discharge Stable           Deshaun Saucedo MD  [address] [facility] [address]  [phone]    In 1 day           Medication List      No changes were made to your prescriptions during this visit.          Oz Anne, DO  10/07/21 1121

## 2021-10-07 VITALS
HEIGHT: 68 IN | HEART RATE: 97 BPM | WEIGHT: 315 LBS | OXYGEN SATURATION: 100 % | RESPIRATION RATE: 15 BRPM | SYSTOLIC BLOOD PRESSURE: 104 MMHG | DIASTOLIC BLOOD PRESSURE: 62 MMHG | BODY MASS INDEX: 47.74 KG/M2 | TEMPERATURE: 97.7 F

## 2021-10-07 LAB
BACTERIA SPEC AEROBE CULT: NORMAL
QT INTERVAL: 383 MS

## 2021-10-07 NOTE — DISCHARGE INSTRUCTIONS
Please have Dr. Saucedo assess the patient's medication profile and adjust accordingly.  Mr. Henry transient alterations in mental status are due to his multiple sedating medications.  Mr. Geiger also requests a wider and more padded bed which will help to reduce the possibility of decubitus ulcers.

## 2021-10-11 LAB
BACTERIA SPEC AEROBE CULT: NORMAL
BACTERIA SPEC AEROBE CULT: NORMAL

## 2021-10-19 ENCOUNTER — HOSPITAL ENCOUNTER (EMERGENCY)
Facility: HOSPITAL | Age: 61
Discharge: NURSING, REHAB OR LONG-TERM CARE | End: 2021-10-19
Attending: EMERGENCY MEDICINE | Admitting: EMERGENCY MEDICINE

## 2021-10-19 ENCOUNTER — HOSPITAL ENCOUNTER (EMERGENCY)
Facility: HOSPITAL | Age: 61
Discharge: NURSING, REHAB OR LONG-TERM CARE | End: 2021-10-20
Attending: EMERGENCY MEDICINE | Admitting: EMERGENCY MEDICINE

## 2021-10-19 ENCOUNTER — APPOINTMENT (OUTPATIENT)
Dept: GENERAL RADIOLOGY | Facility: HOSPITAL | Age: 61
End: 2021-10-19

## 2021-10-19 ENCOUNTER — APPOINTMENT (OUTPATIENT)
Dept: CT IMAGING | Facility: HOSPITAL | Age: 61
End: 2021-10-19

## 2021-10-19 VITALS
OXYGEN SATURATION: 98 % | SYSTOLIC BLOOD PRESSURE: 111 MMHG | HEART RATE: 87 BPM | BODY MASS INDEX: 47.31 KG/M2 | WEIGHT: 312.17 LBS | HEIGHT: 68 IN | TEMPERATURE: 98.4 F | RESPIRATION RATE: 14 BRPM | DIASTOLIC BLOOD PRESSURE: 76 MMHG

## 2021-10-19 DIAGNOSIS — N39.0 URINARY TRACT INFECTION ASSOCIATED WITH INDWELLING URETHRAL CATHETER, INITIAL ENCOUNTER (HCC): ICD-10-CM

## 2021-10-19 DIAGNOSIS — T83.511A URINARY TRACT INFECTION ASSOCIATED WITH INDWELLING URETHRAL CATHETER, INITIAL ENCOUNTER (HCC): ICD-10-CM

## 2021-10-19 DIAGNOSIS — R11.2 NON-INTRACTABLE VOMITING WITH NAUSEA, UNSPECIFIED VOMITING TYPE: ICD-10-CM

## 2021-10-19 DIAGNOSIS — N39.0 ACUTE UTI: Primary | ICD-10-CM

## 2021-10-19 DIAGNOSIS — R11.2 NON-INTRACTABLE VOMITING WITH NAUSEA, UNSPECIFIED VOMITING TYPE: Primary | ICD-10-CM

## 2021-10-19 LAB
ALBUMIN SERPL-MCNC: 3.6 G/DL (ref 3.5–5.2)
ALBUMIN SERPL-MCNC: 3.7 G/DL (ref 3.5–5.2)
ALBUMIN/GLOB SERPL: 0.9 G/DL
ALBUMIN/GLOB SERPL: 1.1 G/DL
ALP SERPL-CCNC: 55 U/L (ref 39–117)
ALP SERPL-CCNC: 61 U/L (ref 39–117)
ALT SERPL W P-5'-P-CCNC: 8 U/L (ref 1–41)
ALT SERPL W P-5'-P-CCNC: 8 U/L (ref 1–41)
AMORPH URATE CRY URNS QL MICRO: ABNORMAL /HPF
ANION GAP SERPL CALCULATED.3IONS-SCNC: 12.5 MMOL/L (ref 5–15)
ANION GAP SERPL CALCULATED.3IONS-SCNC: 9.2 MMOL/L (ref 5–15)
AST SERPL-CCNC: 10 U/L (ref 1–40)
AST SERPL-CCNC: 13 U/L (ref 1–40)
BACTERIA UR QL AUTO: ABNORMAL /HPF
BASOPHILS # BLD AUTO: 0.01 10*3/MM3 (ref 0–0.2)
BASOPHILS # BLD AUTO: 0.01 10*3/MM3 (ref 0–0.2)
BASOPHILS NFR BLD AUTO: 0.1 % (ref 0–1.5)
BASOPHILS NFR BLD AUTO: 0.1 % (ref 0–1.5)
BILIRUB SERPL-MCNC: 0.3 MG/DL (ref 0–1.2)
BILIRUB SERPL-MCNC: 0.3 MG/DL (ref 0–1.2)
BILIRUB UR QL STRIP: ABNORMAL
BUN SERPL-MCNC: 13 MG/DL (ref 6–20)
BUN SERPL-MCNC: 15 MG/DL (ref 6–20)
BUN/CREAT SERPL: 15.3 (ref 7–25)
BUN/CREAT SERPL: 17 (ref 7–25)
CALCIUM SPEC-SCNC: 8.4 MG/DL (ref 8.6–10.5)
CALCIUM SPEC-SCNC: 9 MG/DL (ref 8.6–10.5)
CHLORIDE SERPL-SCNC: 95 MMOL/L (ref 98–107)
CHLORIDE SERPL-SCNC: 96 MMOL/L (ref 98–107)
CLARITY UR: ABNORMAL
CO2 SERPL-SCNC: 25.5 MMOL/L (ref 22–29)
CO2 SERPL-SCNC: 27.8 MMOL/L (ref 22–29)
COLOR UR: ABNORMAL
CREAT SERPL-MCNC: 0.85 MG/DL (ref 0.76–1.27)
CREAT SERPL-MCNC: 0.88 MG/DL (ref 0.76–1.27)
D-LACTATE SERPL-SCNC: 1.1 MMOL/L (ref 0.5–2)
DEPRECATED RDW RBC AUTO: 46.2 FL (ref 37–54)
DEPRECATED RDW RBC AUTO: 46.7 FL (ref 37–54)
EOSINOPHIL # BLD AUTO: 0.05 10*3/MM3 (ref 0–0.4)
EOSINOPHIL # BLD AUTO: 0.09 10*3/MM3 (ref 0–0.4)
EOSINOPHIL NFR BLD AUTO: 0.7 % (ref 0.3–6.2)
EOSINOPHIL NFR BLD AUTO: 1.3 % (ref 0.3–6.2)
ERYTHROCYTE [DISTWIDTH] IN BLOOD BY AUTOMATED COUNT: 15 % (ref 12.3–15.4)
ERYTHROCYTE [DISTWIDTH] IN BLOOD BY AUTOMATED COUNT: 15 % (ref 12.3–15.4)
GFR SERPL CREATININE-BSD FRML MDRD: 89 ML/MIN/1.73
GFR SERPL CREATININE-BSD FRML MDRD: 92 ML/MIN/1.73
GLOBULIN UR ELPH-MCNC: 3.4 GM/DL
GLOBULIN UR ELPH-MCNC: 4.1 GM/DL
GLUCOSE SERPL-MCNC: 130 MG/DL (ref 65–99)
GLUCOSE SERPL-MCNC: 139 MG/DL (ref 65–99)
GLUCOSE UR STRIP-MCNC: NEGATIVE MG/DL
HCT VFR BLD AUTO: 36 % (ref 37.5–51)
HCT VFR BLD AUTO: 39.7 % (ref 37.5–51)
HGB BLD-MCNC: 11.8 G/DL (ref 13–17.7)
HGB BLD-MCNC: 13.1 G/DL (ref 13–17.7)
HGB UR QL STRIP.AUTO: ABNORMAL
HOLD SPECIMEN: NORMAL
HOLD SPECIMEN: NORMAL
HYALINE CASTS UR QL AUTO: ABNORMAL /LPF
IMM GRANULOCYTES # BLD AUTO: 0.03 10*3/MM3 (ref 0–0.05)
IMM GRANULOCYTES # BLD AUTO: 0.03 10*3/MM3 (ref 0–0.05)
IMM GRANULOCYTES NFR BLD AUTO: 0.4 % (ref 0–0.5)
IMM GRANULOCYTES NFR BLD AUTO: 0.4 % (ref 0–0.5)
KETONES UR QL STRIP: ABNORMAL
LEUKOCYTE ESTERASE UR QL STRIP.AUTO: ABNORMAL
LIPASE SERPL-CCNC: 17 U/L (ref 13–60)
LIPASE SERPL-CCNC: 30 U/L (ref 13–60)
LYMPHOCYTES # BLD AUTO: 0.93 10*3/MM3 (ref 0.7–3.1)
LYMPHOCYTES # BLD AUTO: 0.99 10*3/MM3 (ref 0.7–3.1)
LYMPHOCYTES NFR BLD AUTO: 13.5 % (ref 19.6–45.3)
LYMPHOCYTES NFR BLD AUTO: 13.9 % (ref 19.6–45.3)
MCH RBC QN AUTO: 27.9 PG (ref 26.6–33)
MCH RBC QN AUTO: 27.9 PG (ref 26.6–33)
MCHC RBC AUTO-ENTMCNC: 32.8 G/DL (ref 31.5–35.7)
MCHC RBC AUTO-ENTMCNC: 33 G/DL (ref 31.5–35.7)
MCV RBC AUTO: 84.5 FL (ref 79–97)
MCV RBC AUTO: 85.1 FL (ref 79–97)
MONOCYTES # BLD AUTO: 0.74 10*3/MM3 (ref 0.1–0.9)
MONOCYTES # BLD AUTO: 0.75 10*3/MM3 (ref 0.1–0.9)
MONOCYTES NFR BLD AUTO: 10.5 % (ref 5–12)
MONOCYTES NFR BLD AUTO: 10.7 % (ref 5–12)
NEUTROPHILS NFR BLD AUTO: 5.13 10*3/MM3 (ref 1.7–7)
NEUTROPHILS NFR BLD AUTO: 5.27 10*3/MM3 (ref 1.7–7)
NEUTROPHILS NFR BLD AUTO: 73.8 % (ref 42.7–76)
NEUTROPHILS NFR BLD AUTO: 74.6 % (ref 42.7–76)
NITRITE UR QL STRIP: NEGATIVE
NRBC BLD AUTO-RTO: 0 /100 WBC (ref 0–0.2)
NRBC BLD AUTO-RTO: 0 /100 WBC (ref 0–0.2)
PH UR STRIP.AUTO: 5.5 [PH] (ref 5–8)
PLATELET # BLD AUTO: 121 10*3/MM3 (ref 140–450)
PLATELET # BLD AUTO: 129 10*3/MM3 (ref 140–450)
PMV BLD AUTO: 10.5 FL (ref 6–12)
PMV BLD AUTO: 11 FL (ref 6–12)
POTASSIUM SERPL-SCNC: 3.7 MMOL/L (ref 3.5–5.2)
POTASSIUM SERPL-SCNC: 3.7 MMOL/L (ref 3.5–5.2)
PROT SERPL-MCNC: 7 G/DL (ref 6–8.5)
PROT SERPL-MCNC: 7.8 G/DL (ref 6–8.5)
PROT UR QL STRIP: ABNORMAL
RBC # BLD AUTO: 4.23 10*6/MM3 (ref 4.14–5.8)
RBC # BLD AUTO: 4.7 10*6/MM3 (ref 4.14–5.8)
RBC # UR: ABNORMAL /HPF
REF LAB TEST METHOD: ABNORMAL
SODIUM SERPL-SCNC: 132 MMOL/L (ref 136–145)
SODIUM SERPL-SCNC: 134 MMOL/L (ref 136–145)
SP GR UR STRIP: 1.02 (ref 1–1.03)
SQUAMOUS #/AREA URNS HPF: ABNORMAL /HPF
UROBILINOGEN UR QL STRIP: ABNORMAL
WBC # BLD AUTO: 6.89 10*3/MM3 (ref 3.4–10.8)
WBC # BLD AUTO: 7.14 10*3/MM3 (ref 3.4–10.8)
WBC UR QL AUTO: ABNORMAL /HPF
WHOLE BLOOD HOLD SPECIMEN: NORMAL
WHOLE BLOOD HOLD SPECIMEN: NORMAL
YEAST URNS QL MICRO: ABNORMAL /HPF

## 2021-10-19 PROCEDURE — 25010000002 ONDANSETRON PER 1 MG: Performed by: EMERGENCY MEDICINE

## 2021-10-19 PROCEDURE — 74177 CT ABD & PELVIS W/CONTRAST: CPT

## 2021-10-19 PROCEDURE — 25010000002 CEFTRIAXONE PER 250 MG: Performed by: EMERGENCY MEDICINE

## 2021-10-19 PROCEDURE — 80053 COMPREHEN METABOLIC PANEL: CPT | Performed by: NURSE PRACTITIONER

## 2021-10-19 PROCEDURE — 96365 THER/PROPH/DIAG IV INF INIT: CPT

## 2021-10-19 PROCEDURE — 25010000002 ONDANSETRON PER 1 MG: Performed by: NURSE PRACTITIONER

## 2021-10-19 PROCEDURE — 74022 RADEX COMPL AQT ABD SERIES: CPT

## 2021-10-19 PROCEDURE — 85025 COMPLETE CBC W/AUTO DIFF WBC: CPT | Performed by: NURSE PRACTITIONER

## 2021-10-19 PROCEDURE — 83690 ASSAY OF LIPASE: CPT | Performed by: EMERGENCY MEDICINE

## 2021-10-19 PROCEDURE — 99283 EMERGENCY DEPT VISIT LOW MDM: CPT

## 2021-10-19 PROCEDURE — 80053 COMPREHEN METABOLIC PANEL: CPT | Performed by: EMERGENCY MEDICINE

## 2021-10-19 PROCEDURE — 85025 COMPLETE CBC W/AUTO DIFF WBC: CPT | Performed by: EMERGENCY MEDICINE

## 2021-10-19 PROCEDURE — 96375 TX/PRO/DX INJ NEW DRUG ADDON: CPT

## 2021-10-19 PROCEDURE — 83605 ASSAY OF LACTIC ACID: CPT | Performed by: NURSE PRACTITIONER

## 2021-10-19 PROCEDURE — 83690 ASSAY OF LIPASE: CPT | Performed by: NURSE PRACTITIONER

## 2021-10-19 PROCEDURE — 81001 URINALYSIS AUTO W/SCOPE: CPT | Performed by: EMERGENCY MEDICINE

## 2021-10-19 PROCEDURE — 0 IOPAMIDOL PER 1 ML: Performed by: EMERGENCY MEDICINE

## 2021-10-19 PROCEDURE — 96374 THER/PROPH/DIAG INJ IV PUSH: CPT

## 2021-10-19 RX ORDER — SODIUM CHLORIDE 0.9 % (FLUSH) 0.9 %
10 SYRINGE (ML) INJECTION AS NEEDED
Status: DISCONTINUED | OUTPATIENT
Start: 2021-10-19 | End: 2021-10-19 | Stop reason: HOSPADM

## 2021-10-19 RX ORDER — SULFAMETHOXAZOLE AND TRIMETHOPRIM 800; 160 MG/1; MG/1
1 TABLET ORAL 2 TIMES DAILY
Qty: 20 TABLET | Refills: 0 | Status: SHIPPED | OUTPATIENT
Start: 2021-10-19 | End: 2022-03-16

## 2021-10-19 RX ORDER — ONDANSETRON 8 MG/1
8 TABLET, ORALLY DISINTEGRATING ORAL EVERY 8 HOURS PRN
Qty: 20 TABLET | Refills: 0 | Status: SHIPPED | OUTPATIENT
Start: 2021-10-19 | End: 2022-03-16

## 2021-10-19 RX ORDER — ONDANSETRON 2 MG/ML
4 INJECTION INTRAMUSCULAR; INTRAVENOUS ONCE
Status: COMPLETED | OUTPATIENT
Start: 2021-10-19 | End: 2021-10-19

## 2021-10-19 RX ORDER — CEFTRIAXONE SODIUM 1 G/50ML
1 INJECTION, SOLUTION INTRAVENOUS ONCE
Status: COMPLETED | OUTPATIENT
Start: 2021-10-19 | End: 2021-10-19

## 2021-10-19 RX ADMIN — SODIUM CHLORIDE 500 ML: 9 INJECTION, SOLUTION INTRAVENOUS at 22:20

## 2021-10-19 RX ADMIN — ONDANSETRON 4 MG: 2 INJECTION INTRAMUSCULAR; INTRAVENOUS at 22:20

## 2021-10-19 RX ADMIN — CEFTRIAXONE SODIUM 1 G: 1 INJECTION, SOLUTION INTRAVENOUS at 13:36

## 2021-10-19 RX ADMIN — IOPAMIDOL 100 ML: 755 INJECTION, SOLUTION INTRAVENOUS at 22:05

## 2021-10-19 RX ADMIN — SODIUM CHLORIDE 1000 ML: 9 INJECTION, SOLUTION INTRAVENOUS at 10:52

## 2021-10-19 RX ADMIN — ONDANSETRON 4 MG: 2 INJECTION INTRAMUSCULAR; INTRAVENOUS at 10:52

## 2021-10-19 NOTE — ED PROVIDER NOTES
Subjective   History of Present Illness  The patient is a 61-year-old male who resides at [facility].  He reports he began having vomiting this past Saturday.  He also states he had some diarrhea initially but that is now gone.  He states he feels like he was getting better and there is longer time between episodes of vomiting.  He states this morning his PCP came into check on him and at that time he had an episode of vomiting which prompted the PCP to reschedule him to the ER for evaluation.  He denies any fevers or chills.  He denies any abdominal pain.  He does report he is not been able keep anything down of the food or water since this started on Saturday.    Review of Systems   Constitutional: Negative.    HENT: Negative.    Eyes: Negative.    Respiratory: Negative.    Cardiovascular: Negative.    Gastrointestinal: Positive for diarrhea, nausea and vomiting. Negative for abdominal distention, abdominal pain and blood in stool.   Endocrine: Negative.    Genitourinary: Negative.    Musculoskeletal: Negative.    Skin: Negative.    Allergic/Immunologic: Negative.    Neurological: Negative.    Hematological: Negative.    Psychiatric/Behavioral: Negative.    All other systems reviewed and are negative.      Past Medical History:   Diagnosis Date   • Anxiety    • Arthritis    • Depression    • Diabetes mellitus (HCC)    • Disease of thyroid gland    • Gastroparesis    • GERD (gastroesophageal reflux disease)    • Hypertension    • Insomnia    • Myopathy    • Obesity    • Preglaucoma        No Known Allergies    History reviewed. No pertinent surgical history.    History reviewed. No pertinent family history.    Social History     Socioeconomic History   • Marital status:    Tobacco Use   • Smoking status: Never Smoker   • Smokeless tobacco: Never Used   Substance and Sexual Activity   • Alcohol use: Defer   • Drug use: Defer   • Sexual activity: Defer           Objective   Physical Exam  Vital  signs were reviewed.  General appearance - Patient appears well-developed and well-nourished.  Patient is in no acute distress.  Head - Normocephalic, atraumatic.  Pupils - Equal, round, reactive to light.  Extraocular muscles are intact.  Conjunctive is clear  Tympanic membranes - Gray, intact without erythema or retractions.  Oral mucosa - Pink and moist without lesions or erythema.  Uvula is midline.  Neck - Supple.  Trachea was midline.  There is no palpable lymphadenopathy or thyromegaly.  There are no meningeal signs  Lungs - Clear to auscultation and percussion bilaterally.  Heart - Regular rate and rhythm without any murmurs, clicks, or gallops.  Abdomen - Soft.  Obese.  Bowel sounds are present.  There is no rebound, guarding, or rigidity.  There are no palpable masses.  There are no pulsatile masses.  Back - Spine is straight and midline.  There is no CVA tenderness.  Extremities - Intact x4 with full range of motion.  There is no palpable edema.  Neurologic - Patient is awake, alert, and oriented x3.  Cranial nerves II through XII are grossly intact.  Motor and sensory functions grossly intact.  Cerebellar function was normal.  Integument - There are no rashes.  There are no petechia or purpura lesions noted.  There are no vesicular lesions noted.    Procedures           ED Course         The patient was seen and evaluated ED by me.  The above history and physical examination was performed as stated.  The diagnostic data was obtained.  Results reviewed.  Patient was found to have a urinary tract/kidney infection.  There is no evidence of a bowel obstruction.  Patient had resolution of his nausea after IV fluids and antiemetics.  At this point time patient here for discharge back to his nursing home facility with a prescription for antibiotics and and antiemetics.  Patient is agreeable to this treatment plan states he feels much better at this time.                                  MDM    Final diagnoses:    Acute UTI   Non-intractable vomiting with nausea, unspecified vomiting type       ED Disposition  ED Disposition     ED Disposition Condition Comment    Discharge Stable           Deshaun Saucedo MD  [address] [facility] [address] [phone]    In 3 days  If not better         Medication List      New Prescriptions    ondansetron ODT 8 MG disintegrating tablet  Commonly known as: ZOFRAN-ODT  Place 1 tablet on the tongue Every 8 (Eight) Hours As Needed for Nausea or Vomiting.     sulfamethoxazole-trimethoprim 800-160 MG per tablet  Commonly known as: BACTRIM DS,SEPTRA DS  Take 1 tablet by mouth 2 (Two) Times a Day.           Where to Get Your Medications      These medications were sent to [id number] - [place], [place] - [address] AT [facility] - [phone]  - [phone]   [address] [place] [place] [address]    Hours: 24-hours Phone: [phone]   · ondansetron ODT 8 MG disintegrating tablet  · sulfamethoxazole-trimethoprim 800-160 MG per tablet          Evangelist Tom DO  10/19/21 1321

## 2021-10-19 NOTE — DISCHARGE INSTRUCTIONS
Drink plenty of fluids.  Tabiona diet.  Take home medications as previously prescribed.  Take the antibiotics and antivomiting medicine as prescribed by me today.  Follow-up with primary care provider if symptoms or not improving.  Return to the ER for development of severe abdominal pain, persistent fever greater than 101, intractable vomiting or any other concerns issues that may arise.

## 2021-10-20 VITALS
HEIGHT: 68 IN | DIASTOLIC BLOOD PRESSURE: 69 MMHG | HEART RATE: 90 BPM | WEIGHT: 313.94 LBS | TEMPERATURE: 98.1 F | BODY MASS INDEX: 47.58 KG/M2 | OXYGEN SATURATION: 98 % | SYSTOLIC BLOOD PRESSURE: 123 MMHG | RESPIRATION RATE: 16 BRPM

## 2021-10-20 NOTE — ED NOTES
To ER -3 per EMS. Sent from [facility] for evaluation of N/V since Saturday. Patient states he has not vomited since 1500 this afternoon. Patient was seen here this am for same and DC'd back to facility. NH sent patient back stating they want a GI w/u for history of gastroparesis.     Lynnette Davis RN  10/19/21 0000

## 2021-10-20 NOTE — ED NOTES
Patient tolerated popsicle. Given turkey sandwich per HEMANTH Harper.     Lynnette Davis, RN  10/20/21 0002

## 2021-10-20 NOTE — DISCHARGE INSTRUCTIONS
Patient was seen and evaluated in the emergency department had a negative CT scan and is had no vomiting or nausea since in the emergency department.  He has had no abdominal pain since arriving in the emergency department and states he has had none today.  Boutte diet until his symptoms improve.  Complete his previously prescribed antibiotics.  Have him follow-up with Dr. Saucedo tomorrow for further evaluation and treatment.  The patient may return to the emergency department should he have any persistent vomiting, any acutely developing abdominal pain, or any new or worse concerns.

## 2021-10-22 ENCOUNTER — PREP FOR SURGERY (OUTPATIENT)
Dept: OTHER | Facility: HOSPITAL | Age: 61
End: 2021-10-22

## 2021-10-22 ENCOUNTER — OFFICE VISIT (OUTPATIENT)
Dept: GASTROENTEROLOGY | Facility: CLINIC | Age: 61
End: 2021-10-22

## 2021-10-22 VITALS — DIASTOLIC BLOOD PRESSURE: 67 MMHG | SYSTOLIC BLOOD PRESSURE: 127 MMHG | HEART RATE: 110 BPM | OXYGEN SATURATION: 98 %

## 2021-10-22 DIAGNOSIS — R11.2 NAUSEA AND VOMITING, INTRACTABILITY OF VOMITING NOT SPECIFIED, UNSPECIFIED VOMITING TYPE: ICD-10-CM

## 2021-10-22 DIAGNOSIS — K21.9 GASTROESOPHAGEAL REFLUX DISEASE, UNSPECIFIED WHETHER ESOPHAGITIS PRESENT: Primary | ICD-10-CM

## 2021-10-22 DIAGNOSIS — R47.02 DYSPHASIA: Primary | ICD-10-CM

## 2021-10-22 DIAGNOSIS — K31.84 GASTROPARESIS: ICD-10-CM

## 2021-10-22 DIAGNOSIS — R11.0 NAUSEA: ICD-10-CM

## 2021-10-22 DIAGNOSIS — E66.01 CLASS 3 SEVERE OBESITY DUE TO EXCESS CALORIES WITHOUT SERIOUS COMORBIDITY WITH BODY MASS INDEX (BMI) OF 45.0 TO 49.9 IN ADULT (HCC): ICD-10-CM

## 2021-10-22 PROBLEM — E11.9 DIABETES: Status: ACTIVE | Noted: 2021-10-22

## 2021-10-22 PROBLEM — E03.9 HYPOTHYROIDISM: Status: ACTIVE | Noted: 2021-10-22

## 2021-10-22 PROBLEM — F41.9 ANXIETY: Status: ACTIVE | Noted: 2021-10-22

## 2021-10-22 PROBLEM — I10 HYPERTENSION: Status: ACTIVE | Noted: 2021-10-22

## 2021-10-22 PROBLEM — K59.00 CONSTIPATION: Status: ACTIVE | Noted: 2021-10-22

## 2021-10-22 PROBLEM — G47.00 INSOMNIA: Status: ACTIVE | Noted: 2021-10-22

## 2021-10-22 PROBLEM — F32.A DEPRESSION: Status: ACTIVE | Noted: 2021-10-22

## 2021-10-22 PROBLEM — H40.1130 PRIMARY OPEN ANGLE GLAUCOMA (POAG) OF BOTH EYES: Status: ACTIVE | Noted: 2021-06-04

## 2021-10-22 PROCEDURE — 99214 OFFICE O/P EST MOD 30 MIN: CPT | Performed by: NURSE PRACTITIONER

## 2021-10-22 NOTE — PROGRESS NOTES
Chief Complaint        Vomiting    History of Present Illness      Maynor Geiger is a 61 y.o. male who presents to [facility] as a follow-up with a history of vomiting, obesity, anemia, diabetes, gastroparesis and reflux.  Patient is bedbound and coming to us from nursing home facility by transportation via EMS.  Patient reports for the past 2 to 3 weeks he has been vomiting he reports vomiting multiple times a day and being unable to keep food down.  Patient reports that he was seen in the emergency department multiple times for a urinary tract infection and vomiting.  Patient reports he has been taking Zofran as needed with some relief of his nausea and vomiting.  He does have a right midline IV in place where he has been receiving IV fluids for dehydration.  He reports he has not vomited in the past 24 hours and has been able to keep his breakfast down.  He denies any swallowing difficulties.  He denies any hematic emesis or coffee-ground vomit.  Patient denies fever, weight loss, night sweats, melena, hematochezia, hematemesis.    CT scan abdomen and pelvis with contrast no acute findings.  Hepatomegaly.  Splenomegaly.  Hepatic steatosis.  Clustered micronodules in the right lower lobe suggestive of infection or inflammatory small airway disease.    Colonoscopy: Review of the patient's most recent colonoscopy performed by Dr. Crandall on 2/3/2021 normal mucosa grade 1 internal hemorrhoids.    Results       Result Review :   The following data was reviewed by: Reena Montano NP on 10/22/2021     CMP    CMP 10/5/21 10/6/21 10/19/21 10/19/21      1002 2218   Glucose 131 (A) 117 (A) 139 (A) 130 (A)   BUN 35 (A) 26 (A) 13 15   Creatinine 1.83 (A) 1.17 0.85 0.88   eGFR Non  Am 38 (A) 64 92 89   Sodium 130 (A) 136 134 (A) 132 (A)   Potassium 4.0 4.5 3.7 3.7   Chloride 99 105 96 (A) 95 (A)   Calcium 8.6 8.9 9.0 8.4 (A)   Albumin 3.60 3.80 3.70 3.60   Total Bilirubin 0.3 0.3 0.3 0.3    Alkaline Phosphatase 69 70 61 55   AST (SGOT) 8 9 10 13   ALT (SGPT) 7 8 8 8   (A) Abnormal value            CBC    CBC 10/5/21 10/6/21 10/19/21 10/19/21      1002 2218   WBC 8.58 6.82 6.89 7.14   RBC 4.19 4.34 4.70 4.23   Hemoglobin 11.5 (A) 12.1 (A) 13.1 11.8 (A)   Hematocrit 36.3 (A) 37.3 (A) 39.7 36.0 (A)   MCV 86.6 85.9 84.5 85.1   MCH 27.4 27.9 27.9 27.9   MCHC 31.7 32.4 33.0 32.8   RDW 14.9 15.4 15.0 15.0   Platelets 150 149 129 (A) 121 (A)   (A) Abnormal value                   Past Medical History       Past Medical History:   Diagnosis Date   • Anxiety    • Arthritis    • Depression    • Diabetes mellitus (HCC)    • Disease of thyroid gland    • Gastroparesis    • GERD (gastroesophageal reflux disease)    • Hypertension    • Insomnia    • Myopathy    • Myopathy    • Obesity    • Preglaucoma        History reviewed. No pertinent surgical history.      Current Outpatient Medications:   •  amitriptyline (ELAVIL) 25 MG tablet, Take 25 mg by mouth Every Night., Disp: , Rfl:   •  benztropine (COGENTIN) 1 MG tablet, Take 1 mg by mouth 2 (two) times a day., Disp: , Rfl:   •  brimonidine-timolol (COMBIGAN) 0.2-0.5 % ophthalmic solution, Administer 1 drop into the left eye Every 12 (Twelve) Hours., Disp: , Rfl:   •  cyanocobalamin 1000 MCG/ML injection, Inject 1,000 mcg into the appropriate muscle as directed by prescriber Every 7 (Seven) Days. On thursday, Disp: , Rfl:   •  dicyclomine (BENTYL) 10 MG capsule, Take 10 mg by mouth 3 (Three) Times a Day Before Meals., Disp: , Rfl:   •  diphenoxylate-atropine (LOMOTIL) 2.5-0.025 MG per tablet, Take 2 tablets by mouth 4 (Four) Times a Day., Disp: , Rfl:   •  dorzolamide (TRUSOPT) 2 % ophthalmic solution, Administer 1 drop into the left eye 2 (two) times a day., Disp: , Rfl:   •  dry mouth gel (BIOTENE ORALBALANCE) gel, Apply 1 application to the mouth or throat Every 6 (Six) Hours As Needed for Dry Mouth., Disp: , Rfl:   •  DULoxetine (CYMBALTA) 60 MG capsule, Take 60  mg by mouth Daily., Disp: , Rfl:   •  gabapentin (NEURONTIN) 800 MG tablet, Take 800 mg by mouth 3 (Three) Times a Day., Disp: , Rfl:   •  ibuprofen (ADVIL,MOTRIN) 800 MG tablet, Take 800 mg by mouth Every 6 (Six) Hours As Needed for Mild Pain ., Disp: , Rfl:   •  lactulose (CEPHULAC) 10 g packet, Take 30 mL by mouth Daily As Needed for Constipation., Disp: , Rfl:   •  levothyroxine (SYNTHROID, LEVOTHROID) 25 MCG tablet, Take 25 mcg by mouth Every Morning., Disp: , Rfl:   •  Lidocaine 5 % cream, Apply 1 application topically to the appropriate area as directed At Night As Needed (Bilateral feet and ankle pain)., Disp: , Rfl:   •  lisinopril (PRINIVIL,ZESTRIL) 5 MG tablet, Take 5 mg by mouth Daily., Disp: , Rfl:   •  loperamide (Imodium A-D) 2 MG capsule, Take 2 mg by mouth As Needed for Diarrhea., Disp: , Rfl:   •  melatonin 5 MG tablet tablet, Take 10 mg by mouth Every Night., Disp: , Rfl:   •  metFORMIN (GLUCOPHAGE) 1000 MG tablet, Take 1,000 mg by mouth 2 (two) times a day., Disp: , Rfl:   •  methenamine (HIPREX) 1 g tablet, Take 1 g by mouth 2 (Two) Times a Day With Meals., Disp: , Rfl:   •  miconazole (MICOTIN) 2 % cream, Apply 1 application topically to the appropriate area as directed 2 (Two) Times a Day As Needed (Skin Irritation)., Disp: , Rfl:   •  Netarsudil-Latanoprost (Rocklatan) 0.02-0.005 % solution, Administer 1 drop into the left eye Every Night., Disp: , Rfl:   •  nystatin (MYCOSTATIN) 802323 UNIT/GM powder, Apply 1 application topically to the appropriate area as directed 2 (Two) Times a Day., Disp: , Rfl:   •  ondansetron ODT (ZOFRAN-ODT) 8 MG disintegrating tablet, Place 1 tablet on the tongue Every 8 (Eight) Hours As Needed for Nausea or Vomiting., Disp: 20 tablet, Rfl: 0  •  pantoprazole (PROTONIX) 20 MG EC tablet, Take 20 mg by mouth Daily., Disp: , Rfl:   •  Phenol-Glycerin (Chloraseptic Max Sore Throat) 1.5-33 % liquid, Apply 2 application to the mouth or throat 3 (Three) Times a Day As  Needed (Sore throat)., Disp: , Rfl:   •  potassium chloride (K-DUR,KLOR-CON) 20 MEQ CR tablet, Take 20 mEq by mouth Daily., Disp: , Rfl:   •  sulfamethoxazole-trimethoprim (BACTRIM DS,SEPTRA DS) 800-160 MG per tablet, Take 1 tablet by mouth 2 (Two) Times a Day., Disp: 20 tablet, Rfl: 0  •  tamsulosin (FLOMAX) 0.4 MG capsule 24 hr capsule, Take 0.4 mg by mouth Every Night., Disp: , Rfl:      Allergies   Allergen Reactions   • Acetazolamide Unknown - Low Severity       Family History   Problem Relation Age of Onset   • Colon cancer Neg Hx         Social History     Social History Narrative   • Not on file       Objective       Objective     Vital Signs:   /67 (BP Location: Left arm, Patient Position: Sitting, Cuff Size: Adult)   Pulse 110   SpO2 98%     There is no height or weight on file to calculate BMI.    Physical Exam  Constitutional:       General: He is not in acute distress.     Appearance: Normal appearance. He is well-developed. He is obese.   Eyes:      Conjunctiva/sclera: Conjunctivae normal.      Pupils: Pupils are equal, round, and reactive to light.      Visual Fields: Right eye visual fields normal and left eye visual fields normal.   Cardiovascular:      Rate and Rhythm: Normal rate and regular rhythm.      Heart sounds: Normal heart sounds.   Pulmonary:      Effort: Pulmonary effort is normal. No retractions.      Breath sounds: Normal breath sounds and air entry.      Comments: Inspection of chest: normal appearance  Abdominal:      General: Bowel sounds are normal.      Palpations: Abdomen is soft.      Tenderness: There is no abdominal tenderness.      Comments: No appreciable hepatosplenomegaly   Musculoskeletal:      Cervical back: Neck supple.      Right lower leg: No edema.      Left lower leg: No edema.   Lymphadenopathy:      Cervical: No cervical adenopathy.   Skin:     Findings: No lesion.      Comments: Turgor normal   Neurological:      Mental Status: He is alert and oriented to  person, place, and time.      Comments: Bedbound   Psychiatric:         Mood and Affect: Mood and affect normal.              Assessment & Plan          Assessment and Plan    Diagnoses and all orders for this visit:    1. Gastroesophageal reflux disease, unspecified whether esophagitis present (Primary)    2. Nausea    3. Nausea and vomiting, intractability of vomiting not specified, unspecified vomiting type    4. Gastroparesis    5. Class 3 severe obesity due to excess calories without serious comorbidity with body mass index (BMI) of 45.0 to 49.9 in adult (HCC)    61-year-old male presented to the office today as a follow-up with a history of vomiting, obesity, anemia, diabetes, gastroparesis and reflux.  Patient is bedbound and coming to us from nursing home facility by transportation via EMS.  I have recommended that the patient undergo further evaluation with an EGD.  I have discussed this procedure in detail with the patient.  I have discussed the risks, benefits and alternatives.  I have discussed the risk of anesthesia, bleeding and perforation.  Patient understands these risks, benefits and alternatives and wishes to proceed.  I will schedule him at his earliest convenience.  I have refilled the patient's Zofran as this appears to work well for his nausea and vomiting.  We have discussed gastroparesis in length and I have educated the patient on eating very small meals and ensuring that his diabetes is well managed.  We will follow up in the office after endoscopy.  We also discussed possibly seeing the motility clinic if this is related to gastroparesis.              Follow Up       Follow Up   Return for Follow up after endoscopy in office.  Patient was given instructions and counseling regarding his condition or for health maintenance advice. Please see specific information pulled into the AVS if appropriate.

## 2021-10-27 ENCOUNTER — ANESTHESIA EVENT (OUTPATIENT)
Dept: GASTROENTEROLOGY | Facility: HOSPITAL | Age: 61
End: 2021-10-27

## 2021-10-27 ENCOUNTER — ANESTHESIA (OUTPATIENT)
Dept: GASTROENTEROLOGY | Facility: HOSPITAL | Age: 61
End: 2021-10-27

## 2021-10-27 ENCOUNTER — HOSPITAL ENCOUNTER (OUTPATIENT)
Facility: HOSPITAL | Age: 61
Setting detail: HOSPITAL OUTPATIENT SURGERY
Discharge: HOME | End: 2021-10-27
Attending: INTERNAL MEDICINE | Admitting: INTERNAL MEDICINE

## 2021-10-27 VITALS
SYSTOLIC BLOOD PRESSURE: 90 MMHG | OXYGEN SATURATION: 97 % | TEMPERATURE: 97 F | DIASTOLIC BLOOD PRESSURE: 56 MMHG | RESPIRATION RATE: 17 BRPM | HEART RATE: 98 BPM

## 2021-10-27 DIAGNOSIS — R47.02 DYSPHASIA: ICD-10-CM

## 2021-10-27 LAB — GLUCOSE BLDC GLUCOMTR-MCNC: 93 MG/DL (ref 70–99)

## 2021-10-27 PROCEDURE — 88305 TISSUE EXAM BY PATHOLOGIST: CPT | Performed by: INTERNAL MEDICINE

## 2021-10-27 PROCEDURE — 25010000002 PROPOFOL 10 MG/ML EMULSION: Performed by: NURSE ANESTHETIST, CERTIFIED REGISTERED

## 2021-10-27 PROCEDURE — 82962 GLUCOSE BLOOD TEST: CPT

## 2021-10-27 PROCEDURE — 43239 EGD BIOPSY SINGLE/MULTIPLE: CPT | Performed by: INTERNAL MEDICINE

## 2021-10-27 RX ORDER — METOCLOPRAMIDE 10 MG/1
10 TABLET ORAL
Status: ON HOLD | COMMUNITY
End: 2022-03-17 | Stop reason: SDUPTHER

## 2021-10-27 RX ORDER — DEXTROSE AND SODIUM CHLORIDE 5; .45 G/100ML; G/100ML
75 INJECTION, SOLUTION INTRAVENOUS 2 TIMES DAILY
COMMUNITY
End: 2021-12-09

## 2021-10-27 RX ORDER — SACCHAROMYCES BOULARDII 250 MG
250 CAPSULE ORAL 2 TIMES DAILY
COMMUNITY
End: 2021-12-09

## 2021-10-27 RX ORDER — SODIUM CHLORIDE, SODIUM LACTATE, POTASSIUM CHLORIDE, CALCIUM CHLORIDE 600; 310; 30; 20 MG/100ML; MG/100ML; MG/100ML; MG/100ML
30 INJECTION, SOLUTION INTRAVENOUS CONTINUOUS
Status: DISCONTINUED | OUTPATIENT
Start: 2021-10-27 | End: 2021-10-27 | Stop reason: HOSPADM

## 2021-10-27 RX ORDER — PROPOFOL 10 MG/ML
VIAL (ML) INTRAVENOUS AS NEEDED
Status: DISCONTINUED | OUTPATIENT
Start: 2021-10-27 | End: 2021-10-27 | Stop reason: SURG

## 2021-10-27 RX ORDER — LIDOCAINE HYDROCHLORIDE 20 MG/ML
INJECTION, SOLUTION INFILTRATION; PERINEURAL AS NEEDED
Status: DISCONTINUED | OUTPATIENT
Start: 2021-10-27 | End: 2021-10-27 | Stop reason: SURG

## 2021-10-27 RX ORDER — CIPROFLOXACIN 500 MG/1
500 TABLET, FILM COATED ORAL 2 TIMES DAILY
COMMUNITY
Start: 2021-10-22 | End: 2021-10-28

## 2021-10-27 RX ADMIN — PROPOFOL 100 MG: 10 INJECTION, EMULSION INTRAVENOUS at 13:54

## 2021-10-27 RX ADMIN — LIDOCAINE HYDROCHLORIDE 100 MG: 20 INJECTION, SOLUTION INFILTRATION; PERINEURAL at 13:54

## 2021-10-27 RX ADMIN — PROPOFOL 150 MCG/KG/MIN: 10 INJECTION, EMULSION INTRAVENOUS at 13:54

## 2021-10-27 RX ADMIN — SODIUM CHLORIDE, POTASSIUM CHLORIDE, SODIUM LACTATE AND CALCIUM CHLORIDE: 600; 310; 30; 20 INJECTION, SOLUTION INTRAVENOUS at 13:56

## 2021-10-27 NOTE — ANESTHESIA POSTPROCEDURE EVALUATION
Patient: Maynor Geiger    Procedure Summary     Date: 10/27/21 Room / Location: [facility] ENDOSCOPY 2 / [facility] ENDOSCOPY    Anesthesia Start: 1347 Anesthesia Stop: 1409    Procedure: ESOPHAGOGASTRODUODENOSCOPY WITH BIOPSIES (N/A ) Diagnosis:       Dysphasia      (Dysphasia [R47.02])    Surgeons: Calderon Crandall MD Provider: Jennifer Ferrell MD    Anesthesia Type: general ASA Status: 4          Anesthesia Type: general    Vitals  Vitals Value Taken Time   /70 10/27/21 1420   Temp     Pulse 97 10/27/21 1422   Resp 18 10/27/21 1420   SpO2 94 % 10/27/21 1422   Vitals shown include unvalidated device data.        Post Anesthesia Care and Evaluation    Patient location during evaluation: bedside  Patient participation: complete - patient participated  Level of consciousness: awake  Pain score: 0  Pain management: adequate  Airway patency: patent  Anesthetic complications: No anesthetic complications  PONV Status: none  Cardiovascular status: acceptable and stable  Respiratory status: acceptable and room air  Hydration status: acceptable    Comments: An Anesthesiologist personally participated in the most demanding procedures (including induction and emergence if applicable) in the anesthesia plan, monitored the course of anesthesia administration at frequent intervals and remained physically present and available for immediate diagnosis and treatment of emergencies.

## 2021-10-27 NOTE — ANESTHESIA PREPROCEDURE EVALUATION
Anesthesia Evaluation     Patient summary reviewed and Nursing notes reviewed                Airway   Mallampati: III  TM distance: >3 FB  Neck ROM: full  No difficulty expected  Dental    (+) lower dentures and upper dentures    Pulmonary - negative pulmonary ROS   (+) decreased breath sounds,   Cardiovascular - normal exam    Rhythm: regular    (+) hypertension,       Neuro/Psych- negative ROS  GI/Hepatic/Renal/Endo    (+) morbid obesity, GERD,  renal disease, diabetes mellitus,     Musculoskeletal     Abdominal    Substance History - negative use     OB/GYN negative ob/gyn ROS         Other   arthritis,                    Anesthesia Plan    ASA 4     general     intravenous induction     Anesthetic plan, all risks, benefits, and alternatives have been provided, discussed and informed consent has been obtained with: patient.

## 2021-10-28 LAB
CYTO UR: NORMAL
LAB AP CASE REPORT: NORMAL
LAB AP CLINICAL INFORMATION: NORMAL
PATH REPORT.FINAL DX SPEC: NORMAL
PATH REPORT.GROSS SPEC: NORMAL

## 2021-12-09 ENCOUNTER — OFFICE VISIT (OUTPATIENT)
Dept: UROLOGY | Facility: CLINIC | Age: 61
End: 2021-12-09

## 2021-12-09 VITALS
WEIGHT: 313 LBS | TEMPERATURE: 97.5 F | BODY MASS INDEX: 47.44 KG/M2 | HEART RATE: 93 BPM | HEIGHT: 68 IN | DIASTOLIC BLOOD PRESSURE: 38 MMHG | SYSTOLIC BLOOD PRESSURE: 144 MMHG

## 2021-12-09 DIAGNOSIS — N43.3 HYDROCELE, RIGHT: ICD-10-CM

## 2021-12-09 DIAGNOSIS — N39.0 URINARY TRACT INFECTION WITHOUT HEMATURIA, SITE UNSPECIFIED: Primary | ICD-10-CM

## 2021-12-09 DIAGNOSIS — R33.9 URINARY RETENTION: ICD-10-CM

## 2021-12-09 DIAGNOSIS — N43.3 HYDROCELE, RIGHT: Primary | ICD-10-CM

## 2021-12-09 LAB
BACTERIA UR QL AUTO: ABNORMAL /HPF
EPI CELLS #/AREA URNS HPF: 0 /[HPF]
RBC # UR STRIP: ABNORMAL /HPF
RENAL EPITHELIAL, POC: 0
UNIDENT CRYS URNS QL MICRO: ABNORMAL /HPF
WBC # UR STRIP: ABNORMAL /HPF

## 2021-12-09 PROCEDURE — 87186 SC STD MICRODIL/AGAR DIL: CPT | Performed by: UROLOGY

## 2021-12-09 PROCEDURE — 51798 US URINE CAPACITY MEASURE: CPT | Performed by: UROLOGY

## 2021-12-09 PROCEDURE — 99204 OFFICE O/P NEW MOD 45 MIN: CPT | Performed by: UROLOGY

## 2021-12-09 PROCEDURE — 87086 URINE CULTURE/COLONY COUNT: CPT | Performed by: UROLOGY

## 2021-12-09 RX ORDER — ARGININE/GLUTAMINE/CALCIUM BMB 7G-7G-1.5G
POWDER IN PACKET (EA) ORAL
COMMUNITY
End: 2022-03-16

## 2021-12-09 RX ORDER — CASTOR OIL AND BALSAM, PERU 788; 87 MG/G; MG/G
1 OINTMENT TOPICAL 2 TIMES DAILY
COMMUNITY
End: 2022-03-16
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
## 2021-12-16 LAB
BACTERIA UR CULT: ABNORMAL
BACTERIA UR CULT: ABNORMAL
OTHER ANTIBIOTIC SUSC ISLT: ABNORMAL

## 2022-01-12 ENCOUNTER — TRANSCRIBE ORDERS (OUTPATIENT)
Dept: ADMINISTRATIVE | Facility: HOSPITAL | Age: 62
End: 2022-01-12

## 2022-01-12 DIAGNOSIS — H35.82 OCULAR ISCHEMIC SYNDROME: Primary | ICD-10-CM

## 2022-01-18 ENCOUNTER — APPOINTMENT (OUTPATIENT)
Dept: CARDIOLOGY | Facility: HOSPITAL | Age: 62
End: 2022-01-18

## 2022-01-22 PROBLEM — N43.3 HYDROCELE IN ADULT: Status: ACTIVE | Noted: 2022-01-22

## 2022-01-22 PROBLEM — R33.9 URINARY RETENTION: Status: ACTIVE | Noted: 2022-01-22

## 2022-02-06 PROBLEM — N50.82 SCROTAL PAIN: Status: ACTIVE | Noted: 2022-02-06

## 2022-02-06 PROBLEM — N43.3 HYDROCELE: Status: ACTIVE | Noted: 2022-02-06

## 2022-03-16 ENCOUNTER — APPOINTMENT (OUTPATIENT)
Dept: GENERAL RADIOLOGY | Facility: HOSPITAL | Age: 62
End: 2022-03-16

## 2022-03-16 ENCOUNTER — HOSPITAL ENCOUNTER (INPATIENT)
Facility: HOSPITAL | Age: 62
LOS: 2 days | Discharge: NURSING, REHAB OR LONG-TERM CARE | End: 2022-03-18
Attending: EMERGENCY MEDICINE | Admitting: INTERNAL MEDICINE

## 2022-03-16 ENCOUNTER — APPOINTMENT (OUTPATIENT)
Dept: CT IMAGING | Facility: HOSPITAL | Age: 62
End: 2022-03-16

## 2022-03-16 DIAGNOSIS — K56.609 INTESTINAL OBSTRUCTION, UNSPECIFIED CAUSE, UNSPECIFIED WHETHER PARTIAL OR COMPLETE: Primary | ICD-10-CM

## 2022-03-16 DIAGNOSIS — R31.9 URINARY TRACT INFECTION WITH HEMATURIA, SITE UNSPECIFIED: ICD-10-CM

## 2022-03-16 DIAGNOSIS — E87.1 HYPONATREMIA: ICD-10-CM

## 2022-03-16 DIAGNOSIS — R10.84 GENERALIZED ABDOMINAL PAIN: ICD-10-CM

## 2022-03-16 DIAGNOSIS — R11.2 NAUSEA AND VOMITING, INTRACTABILITY OF VOMITING NOT SPECIFIED, UNSPECIFIED VOMITING TYPE: ICD-10-CM

## 2022-03-16 DIAGNOSIS — N39.0 URINARY TRACT INFECTION WITH HEMATURIA, SITE UNSPECIFIED: ICD-10-CM

## 2022-03-16 DIAGNOSIS — E13.9 DIABETES MELLITUS OF OTHER TYPE WITHOUT COMPLICATION, UNSPECIFIED WHETHER LONG TERM INSULIN USE: ICD-10-CM

## 2022-03-16 LAB
ALBUMIN SERPL-MCNC: 3.2 G/DL (ref 3.5–5.2)
ALBUMIN/GLOB SERPL: 0.7 G/DL
ALP SERPL-CCNC: 74 U/L (ref 39–117)
ALT SERPL W P-5'-P-CCNC: 22 U/L (ref 1–41)
ANION GAP SERPL CALCULATED.3IONS-SCNC: 12.7 MMOL/L (ref 5–15)
APTT PPP: 20.6 SECONDS (ref 22.2–34.2)
AST SERPL-CCNC: 19 U/L (ref 1–40)
BACTERIA UR QL AUTO: ABNORMAL /HPF
BASOPHILS # BLD AUTO: 0.01 10*3/MM3 (ref 0–0.2)
BASOPHILS NFR BLD AUTO: 0.1 % (ref 0–1.5)
BILIRUB SERPL-MCNC: 0.6 MG/DL (ref 0–1.2)
BILIRUB UR QL STRIP: NEGATIVE
BUN SERPL-MCNC: 15 MG/DL (ref 8–23)
BUN/CREAT SERPL: 14.9 (ref 7–25)
CALCIUM SPEC-SCNC: 8.7 MG/DL (ref 8.6–10.5)
CHLORIDE SERPL-SCNC: 93 MMOL/L (ref 98–107)
CLARITY UR: ABNORMAL
CO2 SERPL-SCNC: 24.3 MMOL/L (ref 22–29)
COLOR UR: ABNORMAL
CREAT SERPL-MCNC: 1.01 MG/DL (ref 0.76–1.27)
D-LACTATE SERPL-SCNC: 0.8 MMOL/L (ref 0.5–2)
DEPRECATED RDW RBC AUTO: 45.2 FL (ref 37–54)
EGFRCR SERPLBLD CKD-EPI 2021: 85.1 ML/MIN/1.73
EOSINOPHIL # BLD AUTO: 0.01 10*3/MM3 (ref 0–0.4)
EOSINOPHIL NFR BLD AUTO: 0.1 % (ref 0.3–6.2)
ERYTHROCYTE [DISTWIDTH] IN BLOOD BY AUTOMATED COUNT: 14.5 % (ref 12.3–15.4)
GLOBULIN UR ELPH-MCNC: 4.4 GM/DL
GLUCOSE BLDC GLUCOMTR-MCNC: 170 MG/DL (ref 70–99)
GLUCOSE BLDC GLUCOMTR-MCNC: 198 MG/DL (ref 70–99)
GLUCOSE BLDC GLUCOMTR-MCNC: 221 MG/DL (ref 70–99)
GLUCOSE SERPL-MCNC: 206 MG/DL (ref 65–99)
GLUCOSE UR STRIP-MCNC: NEGATIVE MG/DL
HCT VFR BLD AUTO: 35.6 % (ref 37.5–51)
HGB BLD-MCNC: 12 G/DL (ref 13–17.7)
HGB UR QL STRIP.AUTO: NEGATIVE
HOLD SPECIMEN: NORMAL
HOLD SPECIMEN: NORMAL
HYALINE CASTS UR QL AUTO: ABNORMAL /LPF
IMM GRANULOCYTES # BLD AUTO: 0.03 10*3/MM3 (ref 0–0.05)
IMM GRANULOCYTES NFR BLD AUTO: 0.3 % (ref 0–0.5)
INR PPP: 1.01 (ref 2–3)
KETONES UR QL STRIP: NEGATIVE
LEUKOCYTE ESTERASE UR QL STRIP.AUTO: ABNORMAL
LIPASE SERPL-CCNC: 19 U/L (ref 13–60)
LYMPHOCYTES # BLD AUTO: 1.25 10*3/MM3 (ref 0.7–3.1)
LYMPHOCYTES NFR BLD AUTO: 11.1 % (ref 19.6–45.3)
MCH RBC QN AUTO: 29.1 PG (ref 26.6–33)
MCHC RBC AUTO-ENTMCNC: 33.7 G/DL (ref 31.5–35.7)
MCV RBC AUTO: 86.2 FL (ref 79–97)
MONOCYTES # BLD AUTO: 1.09 10*3/MM3 (ref 0.1–0.9)
MONOCYTES NFR BLD AUTO: 9.7 % (ref 5–12)
NEUTROPHILS NFR BLD AUTO: 78.7 % (ref 42.7–76)
NEUTROPHILS NFR BLD AUTO: 8.88 10*3/MM3 (ref 1.7–7)
NITRITE UR QL STRIP: POSITIVE
NRBC BLD AUTO-RTO: 0 /100 WBC (ref 0–0.2)
PH UR STRIP.AUTO: 8 [PH] (ref 5–8)
PLATELET # BLD AUTO: 161 10*3/MM3 (ref 140–450)
PMV BLD AUTO: 11.1 FL (ref 6–12)
POTASSIUM SERPL-SCNC: 3.4 MMOL/L (ref 3.5–5.2)
PROT SERPL-MCNC: 7.6 G/DL (ref 6–8.5)
PROT UR QL STRIP: ABNORMAL
PROTHROMBIN TIME: 10.6 SECONDS (ref 9.4–12)
QT INTERVAL: 346 MS
RBC # BLD AUTO: 4.13 10*6/MM3 (ref 4.14–5.8)
RBC # UR STRIP: ABNORMAL /HPF
REF LAB TEST METHOD: ABNORMAL
SODIUM SERPL-SCNC: 130 MMOL/L (ref 136–145)
SP GR UR STRIP: >=1.03 (ref 1–1.03)
SQUAMOUS #/AREA URNS HPF: ABNORMAL /HPF
TRI-PHOS CRY URNS QL MICRO: ABNORMAL /HPF
TROPONIN T SERPL-MCNC: <0.01 NG/ML (ref 0–0.03)
UROBILINOGEN UR QL STRIP: ABNORMAL
WBC # UR STRIP: ABNORMAL /HPF
WBC NRBC COR # BLD: 11.27 10*3/MM3 (ref 3.4–10.8)
WHOLE BLOOD HOLD SPECIMEN: NORMAL
WHOLE BLOOD HOLD SPECIMEN: NORMAL

## 2022-03-16 PROCEDURE — 93005 ELECTROCARDIOGRAM TRACING: CPT | Performed by: INTERNAL MEDICINE

## 2022-03-16 PROCEDURE — 87077 CULTURE AEROBIC IDENTIFY: CPT | Performed by: INTERNAL MEDICINE

## 2022-03-16 PROCEDURE — 74177 CT ABD & PELVIS W/CONTRAST: CPT

## 2022-03-16 PROCEDURE — 36415 COLL VENOUS BLD VENIPUNCTURE: CPT | Performed by: INTERNAL MEDICINE

## 2022-03-16 PROCEDURE — 85730 THROMBOPLASTIN TIME PARTIAL: CPT | Performed by: INTERNAL MEDICINE

## 2022-03-16 PROCEDURE — 82962 GLUCOSE BLOOD TEST: CPT

## 2022-03-16 PROCEDURE — 85025 COMPLETE CBC W/AUTO DIFF WBC: CPT | Performed by: EMERGENCY MEDICINE

## 2022-03-16 PROCEDURE — 87086 URINE CULTURE/COLONY COUNT: CPT | Performed by: INTERNAL MEDICINE

## 2022-03-16 PROCEDURE — 87186 SC STD MICRODIL/AGAR DIL: CPT | Performed by: INTERNAL MEDICINE

## 2022-03-16 PROCEDURE — 83605 ASSAY OF LACTIC ACID: CPT | Performed by: INTERNAL MEDICINE

## 2022-03-16 PROCEDURE — 85610 PROTHROMBIN TIME: CPT | Performed by: INTERNAL MEDICINE

## 2022-03-16 PROCEDURE — 99223 1ST HOSP IP/OBS HIGH 75: CPT | Performed by: INTERNAL MEDICINE

## 2022-03-16 PROCEDURE — 25010000002 MORPHINE PER 10 MG: Performed by: INTERNAL MEDICINE

## 2022-03-16 PROCEDURE — 74018 RADEX ABDOMEN 1 VIEW: CPT

## 2022-03-16 PROCEDURE — 25010000002 KETOROLAC TROMETHAMINE PER 15 MG: Performed by: NURSE PRACTITIONER

## 2022-03-16 PROCEDURE — 87040 BLOOD CULTURE FOR BACTERIA: CPT | Performed by: INTERNAL MEDICINE

## 2022-03-16 PROCEDURE — 0 POTASSIUM CHLORIDE 10 MEQ/100ML SOLUTION: Performed by: INTERNAL MEDICINE

## 2022-03-16 PROCEDURE — 71045 X-RAY EXAM CHEST 1 VIEW: CPT

## 2022-03-16 PROCEDURE — 80053 COMPREHEN METABOLIC PANEL: CPT | Performed by: EMERGENCY MEDICINE

## 2022-03-16 PROCEDURE — 99254 IP/OBS CNSLTJ NEW/EST MOD 60: CPT | Performed by: SURGERY

## 2022-03-16 PROCEDURE — 99285 EMERGENCY DEPT VISIT HI MDM: CPT

## 2022-03-16 PROCEDURE — 25010000002 LORAZEPAM PER 2 MG: Performed by: INTERNAL MEDICINE

## 2022-03-16 PROCEDURE — 25010000002 CEFTRIAXONE PER 250 MG: Performed by: NURSE PRACTITIONER

## 2022-03-16 PROCEDURE — 84484 ASSAY OF TROPONIN QUANT: CPT | Performed by: INTERNAL MEDICINE

## 2022-03-16 PROCEDURE — 99284 EMERGENCY DEPT VISIT MOD MDM: CPT

## 2022-03-16 PROCEDURE — 25010000002 BENZTROPINE MESYLATE PER 1 MG: Performed by: INTERNAL MEDICINE

## 2022-03-16 PROCEDURE — 63710000001 INSULIN LISPRO (HUMAN) PER 5 UNITS: Performed by: INTERNAL MEDICINE

## 2022-03-16 PROCEDURE — 83690 ASSAY OF LIPASE: CPT | Performed by: EMERGENCY MEDICINE

## 2022-03-16 PROCEDURE — 0 IOPAMIDOL PER 1 ML: Performed by: EMERGENCY MEDICINE

## 2022-03-16 PROCEDURE — 25010000002 ONDANSETRON PER 1 MG: Performed by: NURSE PRACTITIONER

## 2022-03-16 PROCEDURE — 81001 URINALYSIS AUTO W/SCOPE: CPT | Performed by: EMERGENCY MEDICINE

## 2022-03-16 RX ORDER — ACETAMINOPHEN 650 MG/1
650 SUPPOSITORY RECTAL EVERY 4 HOURS PRN
Status: DISCONTINUED | OUTPATIENT
Start: 2022-03-16 | End: 2022-03-18 | Stop reason: HOSPADM

## 2022-03-16 RX ORDER — LOPERAMIDE HYDROCHLORIDE 2 MG/1
2 TABLET ORAL AS NEEDED
COMMUNITY
End: 2022-03-18 | Stop reason: HOSPADM

## 2022-03-16 RX ORDER — KETOROLAC TROMETHAMINE 15 MG/ML
15 INJECTION, SOLUTION INTRAMUSCULAR; INTRAVENOUS ONCE
Status: COMPLETED | OUTPATIENT
Start: 2022-03-16 | End: 2022-03-16

## 2022-03-16 RX ORDER — SODIUM CHLORIDE 0.9 % (FLUSH) 0.9 %
10 SYRINGE (ML) INJECTION AS NEEDED
Status: DISCONTINUED | OUTPATIENT
Start: 2022-03-16 | End: 2022-03-16

## 2022-03-16 RX ORDER — BUSPIRONE HYDROCHLORIDE 10 MG/1
10 TABLET ORAL 3 TIMES DAILY
Status: DISCONTINUED | OUTPATIENT
Start: 2022-03-16 | End: 2022-03-18 | Stop reason: HOSPADM

## 2022-03-16 RX ORDER — NALOXONE HCL 0.4 MG/ML
0.4 VIAL (ML) INJECTION
Status: DISCONTINUED | OUTPATIENT
Start: 2022-03-16 | End: 2022-03-18 | Stop reason: HOSPADM

## 2022-03-16 RX ORDER — DEXTROSE MONOHYDRATE 100 MG/ML
25 INJECTION, SOLUTION INTRAVENOUS
Status: DISCONTINUED | OUTPATIENT
Start: 2022-03-16 | End: 2022-03-18 | Stop reason: HOSPADM

## 2022-03-16 RX ORDER — PANTOPRAZOLE SODIUM 40 MG/10ML
40 INJECTION, POWDER, LYOPHILIZED, FOR SOLUTION INTRAVENOUS
Status: DISCONTINUED | OUTPATIENT
Start: 2022-03-16 | End: 2022-03-18

## 2022-03-16 RX ORDER — SODIUM CHLORIDE, SODIUM LACTATE, POTASSIUM CHLORIDE, CALCIUM CHLORIDE 600; 310; 30; 20 MG/100ML; MG/100ML; MG/100ML; MG/100ML
125 INJECTION, SOLUTION INTRAVENOUS CONTINUOUS
Status: ACTIVE | OUTPATIENT
Start: 2022-03-16 | End: 2022-03-16

## 2022-03-16 RX ORDER — ONDANSETRON 2 MG/ML
4 INJECTION INTRAMUSCULAR; INTRAVENOUS ONCE
Status: COMPLETED | OUTPATIENT
Start: 2022-03-16 | End: 2022-03-16

## 2022-03-16 RX ORDER — MULTIPLE VITAMINS W/ MINERALS TAB 9MG-400MCG
1 TAB ORAL DAILY
COMMUNITY

## 2022-03-16 RX ORDER — NICOTINE POLACRILEX 4 MG
15 LOZENGE BUCCAL
Status: DISCONTINUED | OUTPATIENT
Start: 2022-03-16 | End: 2022-03-18 | Stop reason: HOSPADM

## 2022-03-16 RX ORDER — FAMOTIDINE 10 MG/ML
20 INJECTION, SOLUTION INTRAVENOUS EVERY 12 HOURS SCHEDULED
Status: DISCONTINUED | OUTPATIENT
Start: 2022-03-16 | End: 2022-03-16

## 2022-03-16 RX ORDER — LEVOTHYROXINE SODIUM 0.03 MG/1
25 TABLET ORAL
Status: DISCONTINUED | OUTPATIENT
Start: 2022-03-17 | End: 2022-03-18 | Stop reason: HOSPADM

## 2022-03-16 RX ORDER — ACETAMINOPHEN 160 MG/5ML
650 SOLUTION ORAL EVERY 4 HOURS PRN
Status: DISCONTINUED | OUTPATIENT
Start: 2022-03-16 | End: 2022-03-18 | Stop reason: HOSPADM

## 2022-03-16 RX ORDER — POTASSIUM CHLORIDE 7.45 MG/ML
10 INJECTION INTRAVENOUS
Status: COMPLETED | OUTPATIENT
Start: 2022-03-16 | End: 2022-03-17

## 2022-03-16 RX ORDER — BUSPIRONE HYDROCHLORIDE 10 MG/1
10 TABLET ORAL 3 TIMES DAILY
COMMUNITY

## 2022-03-16 RX ORDER — SODIUM CHLORIDE AND POTASSIUM CHLORIDE 150; 900 MG/100ML; MG/100ML
100 INJECTION, SOLUTION INTRAVENOUS CONTINUOUS
Status: DISCONTINUED | OUTPATIENT
Start: 2022-03-16 | End: 2022-03-17

## 2022-03-16 RX ORDER — AMITRIPTYLINE HYDROCHLORIDE 25 MG/1
25 TABLET, FILM COATED ORAL NIGHTLY
Status: DISCONTINUED | OUTPATIENT
Start: 2022-03-16 | End: 2022-03-18 | Stop reason: HOSPADM

## 2022-03-16 RX ORDER — GLYCERIN 0.25 %
2 DROPS OPHTHALMIC (EYE) 3 TIMES DAILY
COMMUNITY
End: 2022-03-18 | Stop reason: HOSPADM

## 2022-03-16 RX ORDER — ONDANSETRON 2 MG/ML
4 INJECTION INTRAMUSCULAR; INTRAVENOUS EVERY 4 HOURS PRN
Status: DISCONTINUED | OUTPATIENT
Start: 2022-03-16 | End: 2022-03-18 | Stop reason: HOSPADM

## 2022-03-16 RX ORDER — GABAPENTIN 400 MG/1
800 CAPSULE ORAL EVERY 8 HOURS SCHEDULED
Status: DISCONTINUED | OUTPATIENT
Start: 2022-03-16 | End: 2022-03-18 | Stop reason: HOSPADM

## 2022-03-16 RX ORDER — SALIVA STIMULANT COMB. NO.7
1 GEL (GRAM) MUCOUS MEMBRANE EVERY 6 HOURS PRN
COMMUNITY

## 2022-03-16 RX ORDER — DULOXETIN HYDROCHLORIDE 30 MG/1
60 CAPSULE, DELAYED RELEASE ORAL DAILY
Status: DISCONTINUED | OUTPATIENT
Start: 2022-03-16 | End: 2022-03-18 | Stop reason: HOSPADM

## 2022-03-16 RX ORDER — BENZTROPINE MESYLATE 1 MG/ML
1 INJECTION INTRAMUSCULAR; INTRAVENOUS 2 TIMES DAILY
Status: DISCONTINUED | OUTPATIENT
Start: 2022-03-16 | End: 2022-03-18

## 2022-03-16 RX ORDER — CEFTRIAXONE SODIUM 1 G/50ML
1 INJECTION, SOLUTION INTRAVENOUS ONCE
Status: COMPLETED | OUTPATIENT
Start: 2022-03-16 | End: 2022-03-16

## 2022-03-16 RX ORDER — LORAZEPAM 2 MG/ML
1 INJECTION INTRAMUSCULAR EVERY 4 HOURS PRN
Status: DISCONTINUED | OUTPATIENT
Start: 2022-03-16 | End: 2022-03-16

## 2022-03-16 RX ORDER — ONDANSETRON 2 MG/ML
4 INJECTION INTRAMUSCULAR; INTRAVENOUS EVERY 6 HOURS PRN
Status: DISCONTINUED | OUTPATIENT
Start: 2022-03-16 | End: 2022-03-16

## 2022-03-16 RX ORDER — CEFTRIAXONE SODIUM 1 G/50ML
1 INJECTION, SOLUTION INTRAVENOUS EVERY 24 HOURS
Status: DISCONTINUED | OUTPATIENT
Start: 2022-03-17 | End: 2022-03-16

## 2022-03-16 RX ORDER — ACETAMINOPHEN 325 MG/1
650 TABLET ORAL EVERY 4 HOURS PRN
Status: DISCONTINUED | OUTPATIENT
Start: 2022-03-16 | End: 2022-03-18 | Stop reason: HOSPADM

## 2022-03-16 RX ORDER — SODIUM CHLORIDE 0.9 % (FLUSH) 0.9 %
10 SYRINGE (ML) INJECTION EVERY 12 HOURS SCHEDULED
Status: DISCONTINUED | OUTPATIENT
Start: 2022-03-16 | End: 2022-03-18 | Stop reason: HOSPADM

## 2022-03-16 RX ORDER — CEFTRIAXONE SODIUM 1 G/50ML
1 INJECTION, SOLUTION INTRAVENOUS EVERY 24 HOURS
Status: DISCONTINUED | OUTPATIENT
Start: 2022-03-17 | End: 2022-03-17

## 2022-03-16 RX ORDER — MORPHINE SULFATE 2 MG/ML
1 INJECTION, SOLUTION INTRAMUSCULAR; INTRAVENOUS EVERY 4 HOURS PRN
Status: DISCONTINUED | OUTPATIENT
Start: 2022-03-16 | End: 2022-03-18 | Stop reason: HOSPADM

## 2022-03-16 RX ORDER — ONDANSETRON 4 MG/1
4 TABLET, FILM COATED ORAL EVERY 6 HOURS PRN
COMMUNITY

## 2022-03-16 RX ORDER — LOPERAMIDE HYDROCHLORIDE 2 MG/1
2 CAPSULE ORAL 4 TIMES DAILY PRN
COMMUNITY
End: 2022-03-16

## 2022-03-16 RX ADMIN — KETOROLAC TROMETHAMINE 15 MG: 15 INJECTION, SOLUTION INTRAMUSCULAR; INTRAVENOUS at 01:55

## 2022-03-16 RX ADMIN — SODIUM CHLORIDE 1000 ML: 9 INJECTION, SOLUTION INTRAVENOUS at 01:55

## 2022-03-16 RX ADMIN — POTASSIUM CHLORIDE 10 MEQ: 7.46 INJECTION, SOLUTION INTRAVENOUS at 12:27

## 2022-03-16 RX ADMIN — IOPAMIDOL 100 ML: 755 INJECTION, SOLUTION INTRAVENOUS at 03:28

## 2022-03-16 RX ADMIN — LORAZEPAM 1 MG: 2 INJECTION INTRAMUSCULAR; INTRAVENOUS at 06:23

## 2022-03-16 RX ADMIN — Medication 10 ML: at 23:40

## 2022-03-16 RX ADMIN — MORPHINE SULFATE 3 MG: 4 INJECTION, SOLUTION INTRAMUSCULAR; INTRAVENOUS at 20:47

## 2022-03-16 RX ADMIN — ONDANSETRON 4 MG: 2 INJECTION INTRAMUSCULAR; INTRAVENOUS at 01:54

## 2022-03-16 RX ADMIN — MORPHINE SULFATE 1 MG: 2 INJECTION, SOLUTION INTRAMUSCULAR; INTRAVENOUS at 06:21

## 2022-03-16 RX ADMIN — POTASSIUM CHLORIDE 10 MEQ: 7.46 INJECTION, SOLUTION INTRAVENOUS at 11:24

## 2022-03-16 RX ADMIN — CEFTRIAXONE SODIUM 1 G: 1 INJECTION, SOLUTION INTRAVENOUS at 04:27

## 2022-03-16 RX ADMIN — BENZTROPINE MESYLATE 1 MG: 1 INJECTION INTRAMUSCULAR; INTRAVENOUS at 10:21

## 2022-03-16 RX ADMIN — INSULIN LISPRO 4 UNITS: 100 INJECTION, SOLUTION INTRAVENOUS; SUBCUTANEOUS at 08:48

## 2022-03-16 RX ADMIN — POTASSIUM CHLORIDE 10 MEQ: 7.46 INJECTION, SOLUTION INTRAVENOUS at 08:48

## 2022-03-16 RX ADMIN — POTASSIUM CHLORIDE 10 MEQ: 7.46 INJECTION, SOLUTION INTRAVENOUS at 10:21

## 2022-03-16 NOTE — PLAN OF CARE
Goal Outcome Evaluation:      Pt still NPO. Is requesting water. Awaiting gastric study results. UOP good. No complaints of pain or nausea. VSS. Will continue to monitor. Pau Ortiz RN

## 2022-03-16 NOTE — ED PROVIDER NOTES
"Time: 05:56 EDT  Arrived by: EMS  Chief Complaint: abdominal pain  History provided by: Patient  History is limited by: N/A    History of Present Illness:  Patient is a 62 y.o. year old male that presents to the emergency department with generalized abdominal pain and vomiting since Saturday      History provided by:  Patient  Abdominal Pain  Pain location:  Generalized  Pain quality: aching and cramping    Pain radiates to:  Does not radiate  Pain severity:  Moderate  Onset quality:  Gradual  Duration:  4 days  Timing:  Constant  Progression:  Waxing and waning  Chronicity:  New  Context: retching    Context: not sick contacts, not suspicious food intake and not trauma    Context comment:  No known cause  Relieved by:  Nothing  Worsened by:  Palpation and vomiting  Ineffective treatments:  None tried  Associated symptoms: nausea and vomiting    Associated symptoms: no anorexia, no belching, no chest pain, no chills, no constipation, no cough, no diarrhea, no dysuria, no fatigue, no fever, no flatus, no hematemesis, no hematochezia, no hematuria, no melena, no shortness of breath and no sore throat        Similar Symptoms Previously: No  Recently seen: Patient is a resident of a nursing home      Patient Care Team  Primary Care Provider: luan schuster    Past Medical History:     Allergies   Allergen Reactions   • Acetazolamide Unknown - Low Severity     Past Medical History:   Diagnosis Date   • Anxiety    • Arthritis    • Depression    • Diabetes mellitus (HCC)    • Disease of thyroid gland    • Gastroparesis    • GERD (gastroesophageal reflux disease)    • Hypertension    • Insomnia    • Myopathy    • Myopathy    • Obesity    • Preglaucoma      Past Surgical History:   Procedure Laterality Date   • ARM LACERATION REPAIR     • CARDIAC SURGERY      \"graph around main artery'   • ENDOSCOPY N/A 10/27/2021    Procedure: ESOPHAGOGASTRODUODENOSCOPY WITH BIOPSIES;  Surgeon: Calderon Crandall MD;  Location: [facility]" ENDOSCOPY;  Service: Gastroenterology;  Laterality: N/A;  NORMAL EGD   • GALLBLADDER SURGERY       Family History   Problem Relation Age of Onset   • Colon cancer Neg Hx        Home Medications:  Prior to Admission medications    Medication Sig Start Date End Date Taking? Authorizing Provider   amitriptyline (ELAVIL) 25 MG tablet Take 25 mg by mouth Every Night.    Crisleda Garcia MD   benztropine (COGENTIN) 1 MG tablet Take 1 mg by mouth 2 (two) times a day.    Criselda Garcia MD   castor oil-balsam peru (VENELEX) ointment Apply 1 application topically to the appropriate area as directed 2 (Two) Times a Day.    Criselda Garcia MD   cyanocobalamin 1000 MCG/ML injection Inject 1,000 mcg into the appropriate muscle as directed by prescriber Every 7 (Seven) Days. On thursday    Criselda Garcia MD   Dentifrices (BIOTENE DRY MOUTH DT) Apply  to teeth.    Criselda Garcia MD   dicyclomine (BENTYL) 10 MG capsule Take 10 mg by mouth 3 (Three) Times a Day Before Meals.    Criselda Garcia MD   diphenoxylate-atropine (LOMOTIL) 2.5-0.025 MG per tablet Take 2 tablets by mouth 4 (Four) Times a Day.    Criselda Garcia MD   DULoxetine (CYMBALTA) 60 MG capsule Take 60 mg by mouth Daily.    Criselda Garcia MD   gabapentin (NEURONTIN) 800 MG tablet Take 800 mg by mouth 3 (Three) Times a Day.    Criselda Garcia MD   ibuprofen (ADVIL,MOTRIN) 800 MG tablet Take 800 mg by mouth Every 6 (Six) Hours As Needed for Mild Pain .    Criselda Garcia MD   lactulose (CEPHULAC) 10 g packet Take 30 mL by mouth Daily As Needed for Constipation.    Criselda Garcia MD   levothyroxine (SYNTHROID, LEVOTHROID) 25 MCG tablet Take 25 mcg by mouth Every Morning.    Criselda Garcia MD   Lidocaine 5 % cream Apply 1 application topically to the appropriate area as directed At Night As Needed (Bilateral feet and ankle pain).    Criselda Garcia MD   lisinopril (PRINIVIL,ZESTRIL) 5 MG  tablet Take 5 mg by mouth Daily.    Criselda Garcia MD   melatonin 5 MG tablet tablet Take 10 mg by mouth Every Night.    Criselda Garcia MD   methenamine (HIPREX) 1 g tablet Take 1 g by mouth 2 (Two) Times a Day With Meals.    Criselda Garcia MD   metoclopramide (REGLAN) 10 MG tablet Take 10 mg by mouth 4 (Four) Times a Day Before Meals & at Bedtime.    Criselda Garcia MD   Nutritional Supplements (Jan) pack Take  by mouth.    Criselda Garcia MD   ondansetron ODT (ZOFRAN-ODT) 8 MG disintegrating tablet Place 1 tablet on the tongue Every 8 (Eight) Hours As Needed for Nausea or Vomiting. 10/19/21   Evangelist Tom DO   pantoprazole (PROTONIX) 20 MG EC tablet Take 20 mg by mouth Daily.    Criselda Garcia MD   potassium chloride (K-DUR,KLOR-CON) 20 MEQ CR tablet Take 20 mEq by mouth Daily.    Criselda Garcia MD   sulfamethoxazole-trimethoprim (BACTRIM DS,SEPTRA DS) 800-160 MG per tablet Take 1 tablet by mouth 2 (Two) Times a Day. 10/19/21   Evangelist Tom DO   tamsulosin (FLOMAX) 0.4 MG capsule 24 hr capsule Take 0.4 mg by mouth Every Night.    Criselda Garcia MD        Social History:   PT  reports that he has never smoked. He has never used smokeless tobacco. He reports previous alcohol use. Drug use questions deferred to the physician.    Record Review:  I have reviewed the patient's records in [facility].     Review of Systems  Review of Systems   Constitutional: Negative for chills, fatigue and fever.   HENT: Negative for congestion, ear pain and sore throat.    Eyes: Negative for pain.   Respiratory: Negative for cough, chest tightness and shortness of breath.    Cardiovascular: Negative for chest pain.   Gastrointestinal: Positive for abdominal pain, nausea and vomiting. Negative for anorexia, constipation, diarrhea, flatus, hematemesis, hematochezia and melena.   Genitourinary: Negative for dysuria, flank pain and hematuria.   Musculoskeletal: Negative for joint swelling.  "  Skin: Negative for pallor.   Neurological: Negative for seizures and headaches.   Hematological: Negative.    Psychiatric/Behavioral: Negative.    All other systems reviewed and are negative.       Physical Exam  BP 90/72   Pulse 97   Temp 98.4 °F (36.9 °C) (Oral)   Resp 20   Ht 167.6 cm (66\")   Wt (!) 162 kg (357 lb 9.4 oz)   SpO2 98%   BMI 57.72 kg/m²     Physical Exam  Vitals and nursing note reviewed.   Constitutional:       General: He is not in acute distress.     Appearance: Normal appearance. He is obese. He is not toxic-appearing.   HENT:      Head: Normocephalic and atraumatic.      Mouth/Throat:      Mouth: Mucous membranes are moist.   Eyes:      General: No scleral icterus.  Cardiovascular:      Rate and Rhythm: Normal rate and regular rhythm.      Pulses: Normal pulses.      Heart sounds: Normal heart sounds.   Pulmonary:      Effort: Pulmonary effort is normal. No respiratory distress.      Breath sounds: Normal breath sounds.   Abdominal:      General: Abdomen is protuberant. Bowel sounds are normal.      Palpations: Abdomen is soft.      Tenderness: There is generalized abdominal tenderness. There is no right CVA tenderness or left CVA tenderness.   Musculoskeletal:         General: Normal range of motion.      Cervical back: Normal range of motion and neck supple.   Skin:     General: Skin is warm and dry.   Neurological:      Mental Status: He is alert and oriented to person, place, and time. Mental status is at baseline.   Psychiatric:         Mood and Affect: Mood normal.         Behavior: Behavior normal.          ED Course  BP 90/72   Pulse 97   Temp 98.4 °F (36.9 °C) (Oral)   Resp 20   Ht 167.6 cm (66\")   Wt (!) 162 kg (357 lb 9.4 oz)   SpO2 98%   BMI 57.72 kg/m²   Results for orders placed or performed during the hospital encounter of 03/16/22   Comprehensive Metabolic Panel    Specimen: Blood   Result Value Ref Range    Glucose 206 (H) 65 - 99 mg/dL    BUN 15 8 - 23 mg/dL    " Creatinine 1.01 0.76 - 1.27 mg/dL    Sodium 130 (L) 136 - 145 mmol/L    Potassium 3.4 (L) 3.5 - 5.2 mmol/L    Chloride 93 (L) 98 - 107 mmol/L    CO2 24.3 22.0 - 29.0 mmol/L    Calcium 8.7 8.6 - 10.5 mg/dL    Total Protein 7.6 6.0 - 8.5 g/dL    Albumin 3.20 (L) 3.50 - 5.20 g/dL    ALT (SGPT) 22 1 - 41 U/L    AST (SGOT) 19 1 - 40 U/L    Alkaline Phosphatase 74 39 - 117 U/L    Total Bilirubin 0.6 0.0 - 1.2 mg/dL    Globulin 4.4 gm/dL    A/G Ratio 0.7 g/dL    BUN/Creatinine Ratio 14.9 7.0 - 25.0    Anion Gap 12.7 5.0 - 15.0 mmol/L    eGFR 85.1 >60.0 mL/min/1.73   Lipase    Specimen: Blood   Result Value Ref Range    Lipase 19 13 - 60 U/L   Urinalysis With Microscopic If Indicated (No Culture) - Urine, Clean Catch    Specimen: Urine, Clean Catch   Result Value Ref Range    Color, UA Dark Yellow (A) Yellow, Straw    Appearance, UA Turbid (A) Clear    pH, UA 8.0 5.0 - 8.0    Specific Gravity, UA >=1.030 1.005 - 1.030    Glucose, UA Negative Negative    Ketones, UA Negative Negative    Bilirubin, UA Negative Negative    Blood, UA Negative Negative    Protein,  mg/dL (2+) (A) Negative    Leuk Esterase, UA Large (3+) (A) Negative    Nitrite, UA Positive (A) Negative    Urobilinogen, UA 1.0 E.U./dL 0.2 - 1.0 E.U./dL   CBC Auto Differential    Specimen: Blood   Result Value Ref Range    WBC 11.27 (H) 3.40 - 10.80 10*3/mm3    RBC 4.13 (L) 4.14 - 5.80 10*6/mm3    Hemoglobin 12.0 (L) 13.0 - 17.7 g/dL    Hematocrit 35.6 (L) 37.5 - 51.0 %    MCV 86.2 79.0 - 97.0 fL    MCH 29.1 26.6 - 33.0 pg    MCHC 33.7 31.5 - 35.7 g/dL    RDW 14.5 12.3 - 15.4 %    RDW-SD 45.2 37.0 - 54.0 fl    MPV 11.1 6.0 - 12.0 fL    Platelets 161 140 - 450 10*3/mm3    Neutrophil % 78.7 (H) 42.7 - 76.0 %    Lymphocyte % 11.1 (L) 19.6 - 45.3 %    Monocyte % 9.7 5.0 - 12.0 %    Eosinophil % 0.1 (L) 0.3 - 6.2 %    Basophil % 0.1 0.0 - 1.5 %    Immature Grans % 0.3 0.0 - 0.5 %    Neutrophils, Absolute 8.88 (H) 1.70 - 7.00 10*3/mm3    Lymphocytes, Absolute 1.25  0.70 - 3.10 10*3/mm3    Monocytes, Absolute 1.09 (H) 0.10 - 0.90 10*3/mm3    Eosinophils, Absolute 0.01 0.00 - 0.40 10*3/mm3    Basophils, Absolute 0.01 0.00 - 0.20 10*3/mm3    Immature Grans, Absolute 0.03 0.00 - 0.05 10*3/mm3    nRBC 0.0 0.0 - 0.2 /100 WBC   Urinalysis, Microscopic Only - Urine, Clean Catch    Specimen: Urine, Clean Catch   Result Value Ref Range    RBC, UA 6-12 (A) None Seen /HPF    WBC, UA Too Numerous to Count (A) None Seen /HPF    Bacteria, UA 4+ (A) None Seen /HPF    Squamous Epithelial Cells, UA 0-2 None Seen, 0-2 /HPF    Hyaline Casts, UA None Seen None Seen /LPF    Triple Phosphate Crystals, UA Small/1+ None Seen /HPF    Methodology Manual Light Microscopy    Green Top (Gel)   Result Value Ref Range    Extra Tube Hold Specimen    Lavender Top   Result Value Ref Range    Extra Tube Hold Specimen    Gold Top - SST   Result Value Ref Range    Extra Tube Hold Specimen    Light Blue Top   Result Value Ref Range    Extra Tube Hold Specimen      Medications   sodium chloride 0.9 % flush 10 mL (has no administration in time range)   ondansetron (ZOFRAN) injection 4 mg (4 mg Intravenous Given 3/16/22 0154)   sodium chloride 0.9 % bolus 1,000 mL (0 mL Intravenous Stopped 3/16/22 0255)   ketorolac (TORADOL) injection 15 mg (15 mg Intravenous Given 3/16/22 0155)   iopamidol (ISOVUE-370) 76 % injection 100 mL (100 mL Intravenous Given 3/16/22 0328)   cefTRIAXone (ROCEPHIN) IVPB 1 g (0 g Intravenous Stopped 3/16/22 0500)     CT Abdomen Pelvis With Contrast    Result Date: 3/16/2022  Narrative: PROCEDURE: CT ABDOMEN PELVIS W CONTRAST  COMPARISON: 10/19/2021.  INDICATIONS: DIFFUSE ABDOMINAL PAIN; NAUSEA; VOMITING X 4 DAYS.  TECHNIQUE: After obtaining the patient's consent, 695 CT images were created with non-ionic intravenous contrast material.   PROTOCOL:   Standard imaging protocol performed    RADIATION:   DLP: 3,064.1 mGy*cm   Automated exposure control was utilized to minimize radiation dose.  CONTRAST: 100 mL Isovue 370 I.V.  FINDINGS: There is new distension of the stomach and the proximal small bowel.  The maximum diameter of the proximal small bowel is about 7 cm.  The maximum transverse diameter of the stomach is about 12 cm.  There is a relative transition zone in the caliber of the small bowel within the lower central posterior abdomen/upper pelvis, as seen on image 106 of series 205, image 97 of series 201, and image 130 of series 206.  Mild mural thickening involving the proximal small bowel is also seen.  Fecal like content is seen in the lumen of the proximal dilated small bowel.  Etiologies for the suspected small bowel obstruction are many and would include an adhesion.  Infectious/inflammatory bowel disease an associated stricture is possible.  An ischemic or malignant process (or processes) cannot be excluded.  These possibilities are thought to be less likely.  A volvulus or internal hernia or closed loop obstruction cannot be excluded but are thought to be less likely etiologies.  No pneumoperitoneum or pneumatosis.  No portal or mesenteric venous gas is seen.  No extraluminal intraperitoneal or retroperitoneal fluid collections are seen to suggest abscess.  The more distal small bowel is nondilated.  The appendix is not clearly identified.  Please correlate the surgical history.  The colon appears less distended with gas and stool.  Probably no acute colitis.  No acute diverticulitis.  The patient has undergone cholecystectomy.  There is pancreatic lipomatosis.  No acute pancreatitis.  Diffuse hepatic steatosis is seen with hepatomegaly.  There is splenomegaly.  No adrenal mass.  No renal/ureteral stones or hydronephrosis or obstructive uropathy.  No acute pyelonephritis.  A urinary bladder catheter is in place.  The urinary bladder is underdistended.  Surgical clips are identified in the left inguinal region.  There are small to moderate sized bilateral inguinal lymph nodes, which are  nonspecific.  No aneurysmal dilatation of the abdominal aorta.  Probably no cardiac enlargement is present.  Coronary artery calcifications are seen.  There is chronic calcified granulomatous disease of the chest, spleen, and liver.  No definite pleural effusion.  No acute infiltrate is seen in the partially imaged lung bases.  The previously described nodular infiltrate in the lower lobe of the right lung is not identified.  It may have since resolved.  Degenerative changes are seen throughout the imaged spine.  There may be diffuse idiopathic skeletal hyperostosis (DISH).  Degenerative changes involve the hip joints and the bilateral sacroiliac joints.  No acute fracture.  No aggressive osseous lesion is suggested.  Chronic anterolisthesis is seen at L4-5.  Chronic retrolisthesis is seen at L5-S1.  Similar findings were seen previously.      Impression:    1. There is a suspected new partial versus complete mechanical small bowel obstruction with a transition zone involving small bowel in the posterior mid lower abdomen/upper pelvis, as discussed.  No pneumoperitoneum or pneumatosis.  No portal or mesenteric venous gas.  No focal sizable (drainable) extraluminal intraperitoneal or retroperitoneal fluid collection is seen to suggest an abscess.  Consider close interval clinical and imaging follow-up of the findings to ensure a benign progression and to exclude an underlying malignant process, which is thought to be less likely.  2. Please see above comments for further detail.     OVIDIO GLOVER JR, MD       Electronically Signed and Approved By: OVIDIO GLOVER JR, MD on 3/16/2022 at 3:37                 Medical Decision Making:                     MDM  Number of Diagnoses or Management Options  Diabetes mellitus of other type without complication, unspecified whether long term insulin use (HCC)  Generalized abdominal pain  Hyponatremia  Intestinal obstruction, unspecified cause, unspecified whether partial or  complete (HCC)  Nausea and vomiting, intractability of vomiting not specified, unspecified vomiting type  Urinary tract infection with hematuria, site unspecified  Diagnosis management comments: Will be admitted to the hospital based on CT findings of partial versus complete small bowel obstruction with a transition zone noted       Amount and/or Complexity of Data Reviewed  Clinical lab tests: reviewed and ordered  Tests in the radiology section of CPT®: reviewed and ordered  Tests in the medicine section of CPT®: ordered and reviewed  Discuss the patient with other providers: yes (0540-with Dr. Wagner the on-call surgeon.  He states admit patient to the hospitalist.  Make patient n.p.o. and insert NG to low intermittent wall suction    0555-patient will be admitted to hospitalist service by Dr. Luis)    Risk of Complications, Morbidity, and/or Mortality  Presenting problems: moderate  Diagnostic procedures: moderate  Management options: moderate    Patient Progress  Patient progress: stable       Final diagnoses:   Intestinal obstruction, unspecified cause, unspecified whether partial or complete (HCC)   Urinary tract infection with hematuria, site unspecified   Hyponatremia   Generalized abdominal pain   Nausea and vomiting, intractability of vomiting not specified, unspecified vomiting type   Diabetes mellitus of other type without complication, unspecified whether long term insulin use (HCC)        Disposition:  ED Disposition     ED Disposition   Decision to Admit    Condition   --    Comment   --              Shantel Lane, APRN  03/16/22 0557

## 2022-03-16 NOTE — H&P
[facility]   HISTORY AND PHYSICAL    Patient Name: Maynor Geiger  : 1960  MRN: [id number]  Primary Care Physician:  Deshaun Saucedo MD  Date of admission: 3/16/2022    Subjective   Subjective     Chief Complaint:   Abdominal pain    History of Present Illness  62 y.o. year old morbidly obese male  with multiple chronic medical issues including BMI 57, diabetes and chronic indwelling Walter presents with generalized abdominal pain and vomiting since Saturday.    Patient denies any other acute changes: Denies fevers, chills, chest pain, pain with urination/at Walter, diarrhea.    Patient reports passing gas.    In the ED:  Vital signs largely within normal limits    CMP:  -Sodium 130  -Potassium 3.49  -glucose 206    CBC  -WBC 11.27    Urinalysis:  -WBC: TNTC    CT abdomen:  Concerning for obstruction    ED provider ordered ceftriaxone for urinalysis before CT abdomen showed the likely source of abdominal pain.  Given the indwelling Walter, the urinalysis findings are likely chronic.    ED consulted general surgery (Kristy):  -N.p.o.  -NGT is placed     Review of Systems  Review of Systems   Constitutional: Negative for chills, fatigue and fever.   HENT: Negative for congestion, ear pain and sore throat.    Eyes: Negative for pain.   Respiratory: Negative for cough, chest tightness and shortness of breath.    Cardiovascular: Negative for chest pain.   Gastrointestinal: Positive for abdominal pain, nausea and vomiting. Negative for anorexia, constipation, diarrhea, hematemesis, hematochezia and melena.   Genitourinary: Negative for dysuria, flank pain and hematuria.   Musculoskeletal: Negative for joint swelling.   Skin: Negative for pallor.   Neurological: Negative for seizures and headaches.   Hematological: Negative.    Psychiatric/Behavioral: Negative.    All other systems reviewed and are negative.       Personal History     Past Medical History:   Diagnosis Date   • Anxiety    • Arthritis    •  "Depression    • Diabetes mellitus (HCC)    • Disease of thyroid gland    • Gastroparesis    • GERD (gastroesophageal reflux disease)    • Hypertension    • Insomnia    • Myopathy    • Myopathy    • Obesity    • Preglaucoma        Past Surgical History:   Procedure Laterality Date   • ARM LACERATION REPAIR     • CARDIAC SURGERY      \"graph around main artery'   • ENDOSCOPY N/A 10/27/2021    Procedure: ESOPHAGOGASTRODUODENOSCOPY WITH BIOPSIES;  Surgeon: Calderon Crandall MD;  Location: [facility] ENDOSCOPY;  Service: Gastroenterology;  Laterality: N/A;  NORMAL EGD   • GALLBLADDER SURGERY         Family History: family history is not on file. Otherwise pertinent FHx was reviewed and not pertinent to current issue.    Social History:  reports that he has never smoked. He has never used smokeless tobacco. He reports previous alcohol use. Drug use questions deferred to the physician.    Home Medications:  DULoxetine, Dentifrices, Jan, Lidocaine, amitriptyline, benztropine, castor oil-balsam peru, cyanocobalamin, dicyclomine, diphenoxylate-atropine, gabapentin, ibuprofen, lactulose, levothyroxine, lisinopril, loperamide, melatonin, metFORMIN, methenamine, metoclopramide, ondansetron ODT, pantoprazole, potassium chloride, sulfamethoxazole-trimethoprim, and tamsulosin    Allergies:  Allergies   Allergen Reactions   • Acetazolamide Unknown - Low Severity       Objective    Objective     Vitals:   Temp:  [98.4 °F (36.9 °C)] 98.4 °F (36.9 °C)  Heart Rate:  [] 97  Resp:  [20] 20  BP: ()/(40-72) 90/72    Physical Exam  Constitutional:       General: He is in distress due to NG tube.     Appearance: He is  profoundly obese; has a look of someone who stays largely in bed. He is not toxic-appearing.   HENT:      Head: Normocephalic and atraumatic.      Mouth/Throat:      Mouth: Mucous membranes are moist.   Eyes:      General: No scleral icterus.  Cardiovascular:      Rate and Rhythm: Normal rate and regular rhythm. "      Pulses: Normal pulses.      Heart sounds: Normal heart sounds.   Pulmonary:      Effort: Pulmonary effort is normal. No respiratory distress.      Breath sounds: Normal breath sounds.   Abdominal:      General: Abdomen is protuberant. Bowel sounds are normal.      Palpations: Abdomen is soft.      Tenderness: There is no tenderness at the time of my exam.  Musculoskeletal:         General: Very limited movement in his lower extremities.  1+ to 2+ pitting edema bilaterally  Skin:     General: Skin is warm and dry.   Neurological:      Mental Status: He is alert and oriented to person, place, and time. Mental status is at baseline.   Psychiatric:         Mood and Affect: Mood normal.         Behavior: Behavior normal.        Result Review    Result Review:  I have personally reviewed the results from the time of this admission to 3/16/2022 06:28 EDT and agree with these findings:  [x]  Laboratory  []  Microbiology  [x]  Radiology  []  EKG/Telemetry   []  Cardiology/Vascular   []  Pathology  [x]  Old records  []  Other:  Most notable findings include: CT findings concerning for obstruction    Assessment/Plan   Assessment / Plan     Brief Patient Summary:  62 y.o. year old morbidly obese male  with multiple chronic medical issues including BMI 57, diabetes and chronic indwelling Walter presents with generalized abdominal pain and vomiting since Saturday.  Found to have possible obstruction and awaits assessment by general surgery/Dr. Wagner.    Active Hospital Problems:  Active Hospital Problems    Diagnosis    • Intestinal obstruction, unspecified cause, unspecified whether partial or complete ([facility])      Plan:   Abdominal pain/nausea/vomiting  CT imaging concerning for obstruction  -NG tube placed  -N.p.o.  -Note patient appears to be on multiple antidiarrheal agents as well as on lactulose; his med list appears inconsistent  [] Follow-up with Dr. Wagner/GEN surgery for further management    Urinalysis showing:  WBCs TNTC  -Likely chronic issue  -Patient denies any symptoms or changes in urination  -Ceftriaxone was ordered only as one-time dose in ED  [] Can consider continuing antibiotics if felt warranted    Chronic issues:  -Holding almost all oral medications given the NG tube.  -Diabetes: ISS  -Hypertension: Can give IV meds as needed  -Anxiety depression: Have ordered Cymbalta, amitriptyline, and gabapentin if they can be administered    DVT prophylaxis:  Mechanical DVT prophylaxis orders are present.    CODE STATUS:    Code Status (Patient has no pulse and is not breathing): CPR (Attempt to Resuscitate)  Medical Interventions (Patient has pulse or is breathing): Full Support    Admission Status:  I believe this patient meets inpatient status.    Gerardo Luis MD

## 2022-03-16 NOTE — CONSULTS
"General Surgery/Colorectal Surgery Note    Patient Name:  Maynor Geiger  YOB: 1960  [id number]    Referring Provider: Provider, No Known      Patient Care Team:  Deshaun Saucedo MD as PCP - General (Internal Medicine)    Chief complaint abdominal pain    Subjective .     History of present illness:    Transferred from nursing home for abdominal pain nausea and vomiting.  Previous laparoscopic cholecystectomy.  No history of bowel obstruction.  Pain improved.  Flatus 2 hours ago.  Bowel movement yesterday.    Work-up in emergency department with WBC 11, hemoglobin 12, platelets 161, glucose 206, creatinine 1.0, total bilirubin 0.6, lipase 19  CT abdomen and pelvis with suspected partial versus complete mechanical small bowel obstruction with transition zone involving small bowel in the mid lower abdomen, no pneumoperitoneum or pneumatosis.  No blood thinners.    History:  Past Medical History:   Diagnosis Date   • Anxiety    • Arthritis    • Depression    • Diabetes mellitus (HCC)    • Disease of thyroid gland    • Gastroparesis    • GERD (gastroesophageal reflux disease)    • Hypertension    • Insomnia    • Myopathy    • Myopathy    • Obesity    • Preglaucoma        Past Surgical History:   Procedure Laterality Date   • ARM LACERATION REPAIR     • CARDIAC SURGERY      \"graph around main artery'   • ENDOSCOPY N/A 10/27/2021    Procedure: ESOPHAGOGASTRODUODENOSCOPY WITH BIOPSIES;  Surgeon: Calderon Crandall MD;  Location: [facility] END[facility];  Service: Gastroenterology;  Laterality: N/A;  NORMAL EGD   • GALLBLADDER SURGERY         Family History   Problem Relation Age of Onset   • Colon cancer Neg Hx        Social History     Tobacco Use   • Smoking status: Never Smoker   • Smokeless tobacco: Never Used   Vaping Use   • Vaping Use: Never used   Substance Use Topics   • Alcohol use: Not Currently   • Drug use: Defer       Review of Systems  All systems were reviewed and negative except for:   Review " Reviewed results with mother, she confirmed her understanding. of Systems   Constitutional: Negative for chills, fever and unexpected weight loss.   HENT: Negative for congestion, nosebleeds and voice change.    Eyes: Negative for blurred vision, double vision and discharge.   Respiratory: Negative for apnea, chest tightness and shortness of breath.    Cardiovascular: Negative for chest pain and leg swelling.   Gastrointestinal:        See HPI   Endocrine: Negative for cold intolerance and heat intolerance.   Genitourinary: Negative for dysuria, hematuria and urgency.   Musculoskeletal: Negative for back pain, joint swelling and neck pain.   Skin: Negative for color change and dry skin.   Neurological: Negative for dizziness and confusion.   Hematological: Negative for adenopathy.   Psychiatric/Behavioral: Negative for agitation and behavioral problems.     MEDS:  Prior to Admission medications    Medication Sig Start Date End Date Taking? Authorizing Provider   amitriptyline (ELAVIL) 25 MG tablet Take 25 mg by mouth Every Night.    Criselda Garcia MD   benztropine (COGENTIN) 1 MG tablet Take 1 mg by mouth 2 (two) times a day.    Criselda Garcia MD   castor oil-balsam peru (VENELEX) ointment Apply 1 application topically to the appropriate area as directed 2 (Two) Times a Day.    Criselda Garcia MD   cyanocobalamin 1000 MCG/ML injection Inject 1,000 mcg into the appropriate muscle as directed by prescriber Every 7 (Seven) Days. On thursday    Criselda Garcia MD   Dentifrices (BIOTENE DRY MOUTH DT) Apply  to teeth.    Criselda Garcia MD   dicyclomine (BENTYL) 10 MG capsule Take 10 mg by mouth 3 (Three) Times a Day Before Meals.    Criselda Garcia MD   diphenoxylate-atropine (LOMOTIL) 2.5-0.025 MG per tablet Take 2 tablets by mouth 4 (Four) Times a Day.    Criselda Garcia MD   DULoxetine (CYMBALTA) 60 MG capsule Take 60 mg by mouth Daily.    Criselda Garcia MD   gabapentin (NEURONTIN) 800 MG tablet Take 800 mg by mouth 3 (Three)  Times a Day.    Criselda Garcia MD   ibuprofen (ADVIL,MOTRIN) 800 MG tablet Take 800 mg by mouth Every 6 (Six) Hours As Needed for Mild Pain .    Criselda Garcia MD   lactulose (CEPHULAC) 10 g packet Take 30 mL by mouth Daily As Needed for Constipation.    Criselda Garcia MD   levothyroxine (SYNTHROID, LEVOTHROID) 25 MCG tablet Take 25 mcg by mouth Every Morning.    Criselda Garcia MD   Lidocaine 5 % cream Apply 1 application topically to the appropriate area as directed At Night As Needed (Bilateral feet and ankle pain).    Criselda Garcia MD   lisinopril (PRINIVIL,ZESTRIL) 5 MG tablet Take 5 mg by mouth Daily.    Criselda Garcia MD   loperamide (IMODIUM) 2 MG capsule Take 2 mg by mouth 4 (Four) Times a Day As Needed for Diarrhea.    Criselda Garcia MD   melatonin 5 MG tablet tablet Take 10 mg by mouth Every Night.    Criselda Garcia MD   metFORMIN (GLUCOPHAGE) 1000 MG tablet Take 1,000 mg by mouth 2 (Two) Times a Day With Meals.    Criselda Garcia MD   methenamine (HIPREX) 1 g tablet Take 1 g by mouth 2 (Two) Times a Day With Meals.    Criselda Garcia MD   metoclopramide (REGLAN) 10 MG tablet Take 10 mg by mouth 4 (Four) Times a Day Before Meals & at Bedtime.    Criselda Garcia MD   Nutritional Supplements (Jan) pack Take  by mouth.    Criselda Garcia MD   ondansetron ODT (ZOFRAN-ODT) 8 MG disintegrating tablet Place 1 tablet on the tongue Every 8 (Eight) Hours As Needed for Nausea or Vomiting. 10/19/21   Evangelist Tom DO   pantoprazole (PROTONIX) 20 MG EC tablet Take 20 mg by mouth Daily.    Criselda Garcia MD   potassium chloride (K-DUR,KLOR-CON) 20 MEQ CR tablet Take 20 mEq by mouth Daily.    Criselda Garcia MD   sulfamethoxazole-trimethoprim (BACTRIM DS,SEPTRA DS) 800-160 MG per tablet Take 1 tablet by mouth 2 (Two) Times a Day. 10/19/21   Evangelist Tom DO   tamsulosin (FLOMAX) 0.4 MG capsule 24 hr capsule Take 0.4 mg by mouth  Every Night.    Provider, Criselda, MD                    Allergies:  Acetazolamide    Objective     Vital Signs   Temp:  [98.4 °F (36.9 °C)] 98.4 °F (36.9 °C)  Heart Rate:  [] 97  Resp:  [20] 20  BP: ()/(40-72) 90/72    Physical Exam:     General Appearance:    Alert, cooperative, in no acute distress   Head:    Normocephalic, without obvious abnormality, atraumatic   Eyes:          Conjunctivae and sclerae normal, no icterus,     Ears:    Ears appear intact with no abnormalities noted   Throat:   No oral lesions, no thrush, oral mucosa moist   Neck:   No adenopathy, supple, trachea midline, no thyromegaly   Back:     No kyphosis present, no scoliosis present, no skin lesions,      erythema or scars, no tenderness to percussion or                   palpation,   range of motion normal   Lungs:     Clear to auscultation,respirations regular, even and                  unlabored    Heart:    Regular rhythm and normal rate, normal S1 and S2, no            murmur, no gallop, no rub, no click   Chest Wall:    No abnormalities observed   Abdomen:     Normal bowel sounds, no masses, no organomegaly, soft        non-tender, non-distended, no guarding, no rebound                tenderness, morbidly obese   Rectal:        Extremities:   Moves all extremities well, no edema, no cyanosis, no             redness   Pulses:   Pulses palpable and equal bilaterally   Skin:   No bleeding, bruising or rash   Lymph nodes:   No palpable adenopathy   Neurologic:   A/o x 4 with no deficits       Results Review: I have reviewed the patient's labs and imaging    LABS/IMAGING:    Imaging Results (Last 72 Hours)     Procedure Component Value Units Date/Time    CT Abdomen Pelvis With Contrast [[id number]] Collected: 03/16/22 0337     Updated: 03/16/22 0340    Narrative:      PROCEDURE: CT ABDOMEN PELVIS W CONTRAST     COMPARISON: 10/19/2021.     INDICATIONS: DIFFUSE ABDOMINAL PAIN; NAUSEA; VOMITING X 4 DAYS.     TECHNIQUE: After  obtaining the patient's consent, 695 CT images were created with non-ionic   intravenous contrast material.       PROTOCOL:   Standard imaging protocol performed      RADIATION:   DLP: 3,064.1 mGy*cm    Automated exposure control was utilized to minimize radiation dose.   CONTRAST: 100 mL Isovue 370 I.V.     FINDINGS: There is new distension of the stomach and the proximal small bowel.  The maximum   diameter of the proximal small bowel is about 7 cm.  The maximum transverse diameter of the stomach   is about 12 cm.  There is a relative transition zone in the caliber of the small bowel within the   lower central posterior abdomen/upper pelvis, as seen on image 106 of series 205, image 97 of   series 201, and image 130 of series 206.  Mild mural thickening involving the proximal small bowel   is also seen.  Fecal like content is seen in the lumen of the proximal dilated small bowel.    Etiologies for the suspected small bowel obstruction are many and would include an adhesion.    Infectious/inflammatory bowel disease an associated stricture is possible.  An ischemic or   malignant process (or processes) cannot be excluded.  These possibilities are thought to be less   likely.  A volvulus or internal hernia or closed loop obstruction cannot be excluded but are   thought to be less likely etiologies.  No pneumoperitoneum or pneumatosis.  No portal or mesenteric   venous gas is seen.  No extraluminal intraperitoneal or retroperitoneal fluid collections are seen   to suggest abscess.  The more distal small bowel is nondilated.  The appendix is not clearly   identified.  Please correlate the surgical history.  The colon appears less distended with gas and   stool.  Probably no acute colitis.  No acute diverticulitis.  The patient has undergone   cholecystectomy.  There is pancreatic lipomatosis.  No acute pancreatitis.  Diffuse hepatic   steatosis is seen with hepatomegaly.  There is splenomegaly.  No adrenal mass.  No  renal/ureteral   stones or hydronephrosis or obstructive uropathy.  No acute pyelonephritis.  A urinary bladder   catheter is in place.  The urinary bladder is underdistended.  Surgical clips are identified in the   left inguinal region.  There are small to moderate sized bilateral inguinal lymph nodes, which are   nonspecific.  No aneurysmal dilatation of the abdominal aorta.  Probably no cardiac enlargement is   present.  Coronary artery calcifications are seen.  There is chronic calcified granulomatous   disease of the chest, spleen, and liver.  No definite pleural effusion.  No acute infiltrate is   seen in the partially imaged lung bases.  The previously described nodular infiltrate in the lower   lobe of the right lung is not identified.  It may have since resolved.  Degenerative changes are   seen throughout the imaged spine.  There may be diffuse idiopathic skeletal hyperostosis (DISH).    Degenerative changes involve the hip joints and the bilateral sacroiliac joints.  No acute   fracture.  No aggressive osseous lesion is suggested.  Chronic anterolisthesis is seen at L4-5.    Chronic retrolisthesis is seen at L5-S1.  Similar findings were seen previously.       Impression:            1. There is a suspected new partial versus complete mechanical small bowel obstruction with a   transition zone involving small bowel in the posterior mid lower abdomen/upper pelvis, as   discussed.  No pneumoperitoneum or pneumatosis.  No portal or mesenteric venous gas.  No focal   sizable (drainable) extraluminal intraperitoneal or retroperitoneal fluid collection is seen to   suggest an abscess.  Consider close interval clinical and imaging follow-up of the findings to   ensure a benign progression and to exclude an underlying malignant process, which is thought to be   less likely.     2. Please see above comments for further detail.             OVIDIO GLOVER JR, MD         Electronically Signed and Approved By: OVIDIO SEQUEIRA  ADALBERTO CORRAL MD on 3/16/2022 at 3:37                            Lab Results (last 72 hours)     Procedure Component Value Units Date/Time    Urinalysis, Microscopic Only - Urine, Clean Catch [[id number]]  (Abnormal) Collected: 03/16/22 0326    Specimen: Urine, Clean Catch Updated: 03/16/22 0403     RBC, UA 6-12 /HPF      WBC, UA Too Numerous to Count /HPF      Bacteria, UA 4+ /HPF      Squamous Epithelial Cells, UA 0-2 /HPF      Hyaline Casts, UA None Seen /LPF      Triple Phosphate Crystals, UA Small/1+ /HPF      Methodology Manual Light Microscopy    Urinalysis With Microscopic If Indicated (No Culture) - Urine, Clean Catch [[id number]]  (Abnormal) Collected: 03/16/22 0326    Specimen: Urine, Clean Catch Updated: 03/16/22 0350     Color, UA Dark Yellow     Appearance, UA Turbid     pH, UA 8.0     Specific Gravity, UA >=1.030     Glucose, UA Negative     Ketones, UA Negative     Bilirubin, UA Negative     Blood, UA Negative     Protein,  mg/dL (2+)     Leuk Esterase, UA Large (3+)     Nitrite, UA Positive     Urobilinogen, UA 1.0 E.U./dL    Comprehensive Metabolic Panel [[id number]]  (Abnormal) Collected: 03/16/22 0220    Specimen: Blood Updated: 03/16/22 0244     Glucose 206 mg/dL      BUN 15 mg/dL      Creatinine 1.01 mg/dL      Sodium 130 mmol/L      Potassium 3.4 mmol/L      Comment: Slight hemolysis detected by analyzer. Results may be affected.        Chloride 93 mmol/L      CO2 24.3 mmol/L      Calcium 8.7 mg/dL      Total Protein 7.6 g/dL      Albumin 3.20 g/dL      ALT (SGPT) 22 U/L      AST (SGOT) 19 U/L      Alkaline Phosphatase 74 U/L      Total Bilirubin 0.6 mg/dL      Globulin 4.4 gm/dL      A/G Ratio 0.7 g/dL      BUN/Creatinine Ratio 14.9     Anion Gap 12.7 mmol/L      eGFR 85.1 mL/min/1.73      Comment: National Kidney Foundation and American Society of Nephrology (ASN) Task Force recommended calculation based on the Chronic Kidney Disease Epidemiology Collaboration (CKD-EPI) equation refit  without adjustment for race.       Narrative:      GFR Normal >60  Chronic Kidney Disease <60  Kidney Failure <15      Lipase [[id number]]  (Normal) Collected: 03/16/22 0220    Specimen: Blood Updated: 03/16/22 0244     Lipase 19 U/L     [place] Draw [[id number]] Collected: 03/16/22 0220    Specimen: Blood Updated: 03/16/22 0241    Narrative:      The following orders were created for panel order [place] Draw.  Procedure                               Abnormality         Status                     ---------                               -----------         ------                     Green Top (Gel)[[id number]]                                  Final result               Lavender Top[[id number]]                                     Final result               Gold Top - SST[[id number]]                                   Final result               Light Blue Top[[id number]]                                   Final result                 Please view results for these tests on the individual orders.    Gold Top - SST [[id number]] Collected: 03/16/22 0220    Specimen: Blood Updated: 03/16/22 0241     Extra Tube Hold Specimen    Light Blue Top [[id number]] Collected: 03/16/22 0220    Specimen: Blood Updated: 03/16/22 0241     Extra Tube Hold Specimen    Lavender Top [[id number]] Collected: 03/16/22 0220    Specimen: Blood Updated: 03/16/22 0241     Extra Tube Hold Specimen    Green Top (Gel) [[id number]] Collected: 03/16/22 0220    Specimen: Blood Updated: 03/16/22 0241     Extra Tube Hold Specimen    CBC & Differential [[id number]]  (Abnormal) Collected: 03/16/22 0220    Specimen: Blood Updated: 03/16/22 0227    Narrative:      The following orders were created for panel order CBC & Differential.  Procedure                               Abnormality         Status                     ---------                               -----------         ------                     CBC Auto Differential[[id number]]        Abnormal            Final  result                 Please view results for these tests on the individual orders.    CBC Auto Differential [[id number]]  (Abnormal) Collected: 03/16/22 0220    Specimen: Blood Updated: 03/16/22 0227     WBC 11.27 10*3/mm3      RBC 4.13 10*6/mm3      Hemoglobin 12.0 g/dL      Hematocrit 35.6 %      MCV 86.2 fL      MCH 29.1 pg      MCHC 33.7 g/dL      RDW 14.5 %      RDW-SD 45.2 fl      MPV 11.1 fL      Platelets 161 10*3/mm3      Neutrophil % 78.7 %      Lymphocyte % 11.1 %      Monocyte % 9.7 %      Eosinophil % 0.1 %      Basophil % 0.1 %      Immature Grans % 0.3 %      Neutrophils, Absolute 8.88 10*3/mm3      Lymphocytes, Absolute 1.25 10*3/mm3      Monocytes, Absolute 1.09 10*3/mm3      Eosinophils, Absolute 0.01 10*3/mm3      Basophils, Absolute 0.01 10*3/mm3      Immature Grans, Absolute 0.03 10*3/mm3      nRBC 0.0 /100 WBC              Result Review :     Assessment/Plan     * No active hospital problems. *     Partial small bowel obstruction versus small bowel obstruction  Leukocytosis    I have reviewed the patient's work-up with the results mentioned above.  I recommend admission to the hospitalist.  N.p.o.  IV fluids.  Recommended NG tube placement which the patient has refused.  With the patient recently having passed flatus, will obtain Gastrografin challenge.  If the patient has contrast in his colon then we can start clear liquids and discharge the patient.  No surgery at this time.  All questions answered.  Patient agrees with the plan.  Orders placed.       Hal Wagner MD  03/16/22 05:54 EDT

## 2022-03-17 ENCOUNTER — APPOINTMENT (OUTPATIENT)
Dept: GENERAL RADIOLOGY | Facility: HOSPITAL | Age: 62
End: 2022-03-17

## 2022-03-17 LAB
ANION GAP SERPL CALCULATED.3IONS-SCNC: 12.1 MMOL/L (ref 5–15)
BASOPHILS # BLD AUTO: 0.01 10*3/MM3 (ref 0–0.2)
BASOPHILS NFR BLD AUTO: 0.1 % (ref 0–1.5)
BUN SERPL-MCNC: 19 MG/DL (ref 8–23)
BUN/CREAT SERPL: 23.8 (ref 7–25)
CALCIUM SPEC-SCNC: 8.4 MG/DL (ref 8.6–10.5)
CHLORIDE SERPL-SCNC: 99 MMOL/L (ref 98–107)
CO2 SERPL-SCNC: 22.9 MMOL/L (ref 22–29)
CREAT SERPL-MCNC: 0.8 MG/DL (ref 0.76–1.27)
DEPRECATED RDW RBC AUTO: 44.7 FL (ref 37–54)
EGFRCR SERPLBLD CKD-EPI 2021: 101.3 ML/MIN/1.73
EOSINOPHIL # BLD AUTO: 0.01 10*3/MM3 (ref 0–0.4)
EOSINOPHIL NFR BLD AUTO: 0.1 % (ref 0.3–6.2)
ERYTHROCYTE [DISTWIDTH] IN BLOOD BY AUTOMATED COUNT: 14.5 % (ref 12.3–15.4)
GLUCOSE BLDC GLUCOMTR-MCNC: 166 MG/DL (ref 70–99)
GLUCOSE BLDC GLUCOMTR-MCNC: 168 MG/DL (ref 70–99)
GLUCOSE SERPL-MCNC: 171 MG/DL (ref 65–99)
HCT VFR BLD AUTO: 34.1 % (ref 37.5–51)
HGB BLD-MCNC: 11 G/DL (ref 13–17.7)
IMM GRANULOCYTES # BLD AUTO: 0.03 10*3/MM3 (ref 0–0.05)
IMM GRANULOCYTES NFR BLD AUTO: 0.3 % (ref 0–0.5)
LYMPHOCYTES # BLD AUTO: 1.16 10*3/MM3 (ref 0.7–3.1)
LYMPHOCYTES NFR BLD AUTO: 12 % (ref 19.6–45.3)
MAGNESIUM SERPL-MCNC: 2 MG/DL (ref 1.6–2.4)
MCH RBC QN AUTO: 27.8 PG (ref 26.6–33)
MCHC RBC AUTO-ENTMCNC: 32.3 G/DL (ref 31.5–35.7)
MCV RBC AUTO: 86.1 FL (ref 79–97)
MONOCYTES # BLD AUTO: 1.13 10*3/MM3 (ref 0.1–0.9)
MONOCYTES NFR BLD AUTO: 11.6 % (ref 5–12)
NEUTROPHILS NFR BLD AUTO: 7.36 10*3/MM3 (ref 1.7–7)
NEUTROPHILS NFR BLD AUTO: 75.9 % (ref 42.7–76)
NRBC BLD AUTO-RTO: 0 /100 WBC (ref 0–0.2)
PHOSPHATE SERPL-MCNC: 2.4 MG/DL (ref 2.5–4.5)
PLATELET # BLD AUTO: 149 10*3/MM3 (ref 140–450)
PMV BLD AUTO: 11.2 FL (ref 6–12)
POTASSIUM SERPL-SCNC: 3.9 MMOL/L (ref 3.5–5.2)
RBC # BLD AUTO: 3.96 10*6/MM3 (ref 4.14–5.8)
SODIUM SERPL-SCNC: 134 MMOL/L (ref 136–145)
WBC NRBC COR # BLD: 9.7 10*3/MM3 (ref 3.4–10.8)

## 2022-03-17 PROCEDURE — 63710000001 INSULIN LISPRO (HUMAN) PER 5 UNITS: Performed by: INTERNAL MEDICINE

## 2022-03-17 PROCEDURE — 84100 ASSAY OF PHOSPHORUS: CPT | Performed by: INTERNAL MEDICINE

## 2022-03-17 PROCEDURE — 82962 GLUCOSE BLOOD TEST: CPT

## 2022-03-17 PROCEDURE — 25010000002 ONDANSETRON PER 1 MG: Performed by: INTERNAL MEDICINE

## 2022-03-17 PROCEDURE — 99232 SBSQ HOSP IP/OBS MODERATE 35: CPT | Performed by: SURGERY

## 2022-03-17 PROCEDURE — 85025 COMPLETE CBC W/AUTO DIFF WBC: CPT | Performed by: INTERNAL MEDICINE

## 2022-03-17 PROCEDURE — 25010000002 MORPHINE PER 10 MG: Performed by: INTERNAL MEDICINE

## 2022-03-17 PROCEDURE — 80048 BASIC METABOLIC PNL TOTAL CA: CPT | Performed by: INTERNAL MEDICINE

## 2022-03-17 PROCEDURE — 99239 HOSP IP/OBS DSCHRG MGMT >30: CPT | Performed by: INTERNAL MEDICINE

## 2022-03-17 PROCEDURE — 25010000002 CEFTRIAXONE PER 250 MG: Performed by: INTERNAL MEDICINE

## 2022-03-17 PROCEDURE — 25010000002 BENZTROPINE MESYLATE PER 1 MG: Performed by: INTERNAL MEDICINE

## 2022-03-17 PROCEDURE — 74018 RADEX ABDOMEN 1 VIEW: CPT

## 2022-03-17 PROCEDURE — 83735 ASSAY OF MAGNESIUM: CPT | Performed by: INTERNAL MEDICINE

## 2022-03-17 PROCEDURE — 25010000002 SODIUM CHLORIDE 0.9 % WITH KCL 20 MEQ 20-0.9 MEQ/L-% SOLUTION: Performed by: INTERNAL MEDICINE

## 2022-03-17 RX ORDER — ACETAMINOPHEN 325 MG/1
650 TABLET ORAL EVERY 4 HOURS PRN
Start: 2022-03-17

## 2022-03-17 RX ORDER — AMITRIPTYLINE HYDROCHLORIDE 25 MG/1
25 TABLET, FILM COATED ORAL NIGHTLY
Start: 2022-03-17

## 2022-03-17 RX ORDER — METOCLOPRAMIDE 10 MG/1
10 TABLET ORAL 3 TIMES DAILY PRN
Start: 2022-03-17

## 2022-03-17 RX ORDER — CEFDINIR 300 MG/1
300 CAPSULE ORAL 2 TIMES DAILY
Start: 2022-03-18 | End: 2022-03-24

## 2022-03-17 RX ORDER — CEFTRIAXONE SODIUM 1 G/50ML
1 INJECTION, SOLUTION INTRAVENOUS EVERY 24 HOURS
Status: DISCONTINUED | OUTPATIENT
Start: 2022-03-17 | End: 2022-03-18 | Stop reason: HOSPADM

## 2022-03-17 RX ORDER — CHOLESTYRAMINE 4 G/9G
1 POWDER, FOR SUSPENSION ORAL DAILY PRN
Start: 2022-03-17

## 2022-03-17 RX ADMIN — CEFTRIAXONE SODIUM 1 G: 1 INJECTION, SOLUTION INTRAVENOUS at 14:05

## 2022-03-17 RX ADMIN — BUSPIRONE HYDROCHLORIDE 10 MG: 10 TABLET ORAL at 17:05

## 2022-03-17 RX ADMIN — GABAPENTIN 800 MG: 400 CAPSULE ORAL at 21:54

## 2022-03-17 RX ADMIN — BUSPIRONE HYDROCHLORIDE 10 MG: 10 TABLET ORAL at 20:18

## 2022-03-17 RX ADMIN — POTASSIUM CHLORIDE AND SODIUM CHLORIDE 100 ML/HR: 900; 150 INJECTION, SOLUTION INTRAVENOUS at 04:41

## 2022-03-17 RX ADMIN — AMITRIPTYLINE HYDROCHLORIDE 25 MG: 25 TABLET, FILM COATED ORAL at 20:19

## 2022-03-17 RX ADMIN — INSULIN LISPRO 2 UNITS: 100 INJECTION, SOLUTION INTRAVENOUS; SUBCUTANEOUS at 17:05

## 2022-03-17 RX ADMIN — Medication 10 ML: at 21:00

## 2022-03-17 RX ADMIN — BENZTROPINE MESYLATE 1 MG: 1 INJECTION INTRAMUSCULAR; INTRAVENOUS at 20:18

## 2022-03-17 RX ADMIN — PANTOPRAZOLE SODIUM 40 MG: 40 INJECTION, POWDER, LYOPHILIZED, FOR SOLUTION INTRAVENOUS at 06:19

## 2022-03-17 RX ADMIN — ONDANSETRON 4 MG: 2 INJECTION INTRAMUSCULAR; INTRAVENOUS at 04:40

## 2022-03-17 RX ADMIN — MORPHINE SULFATE 3 MG: 4 INJECTION, SOLUTION INTRAMUSCULAR; INTRAVENOUS at 20:18

## 2022-03-17 NOTE — PLAN OF CARE
Goal Outcome Evaluation:  Plan of Care Reviewed With: patient        Progress: improving  Outcome Evaluation: Diet advanced to regular. Patient denies any abdominal pain or nausea. Blood sugar 168. VS stable, tachycardic. Walter care done. Repositioning q2hr. To discharge to [facility] tomorrow[facility]

## 2022-03-17 NOTE — PROGRESS NOTES
SURGERY PROGRESS NOTE     Patient Name:  Maynor Geiger  YOB: 1960  [id number]   LOS: 1 day   * No surgery found *  Patient Care Team:  Deshaun Saucedo MD as PCP - General (Internal Medicine)      Subjective     Interval History: Afebrile, vital signs stable.  KUB with no contrast in the colon.  WBC 9, hemoglobin 11, creatinine 0.8.  Positive flatus.  No abdominal pain.      Review of Systems:    All systems were reviewed and negative except for: No nausea vomiting diarrhea    Objective     Vital Signs  Temp:  [97.9 °F (36.6 °C)-99.5 °F (37.5 °C)] 98.9 °F (37.2 °C)  Heart Rate:  [] 99  Resp:  [18] 18  BP: (114-135)/(51-68) 135/68    Physical Exam:     General Appearance:   Alert, cooperative, in no acute distress   Head:   Normocephalic, without obvious abnormality, atraumatic   Eyes:            Lids and lashes normal, conjunctivae and sclerae normal, no      Icterus    Ears:   Ears appear intact with no abnormalities noted   Throat:  No oral lesions, no thrush, oral mucosa moist   Neck:  No adenopathy, supple, trachea midline, no thyromegaly, no     carotid bruit, no JVD   Back:    no tenderness to percussion or palpation,   range of motion       normal   Lungs:    Clear to auscultation,respirations regular, even and                     unlabored    Heart:   Regular rhythm and normal rate, no murmur, no gallop, no rub   Chest Wall:   No abnormalities observed   Abdomen:    Normal bowel sounds, no masses, no organomegaly, soft          non-tender, non-distended, no guarding, no rebound                  tenderness   Rectal:      Extremities:  Moves all extremities well, no edema, no cyanosis, no                redness   Skin:  No bleeding, bruising or rash   Lymph nodes:  No palpable adenopathy   Neurologic:  A/Ox4 with no deficits        Results Review:     I reviewed the patient's new clinical results.    LABS:  Lab Results (last 72 hours)     Procedure Component Value Units Date/Time     Basic Metabolic Panel [[id number]]  (Abnormal) Collected: 03/17/22 0714    Specimen: Blood Updated: 03/17/22 0741     Glucose 171 mg/dL      BUN 19 mg/dL      Creatinine 0.80 mg/dL      Sodium 134 mmol/L      Potassium 3.9 mmol/L      Comment: Slight hemolysis detected by analyzer. Results may be affected.        Chloride 99 mmol/L      CO2 22.9 mmol/L      Calcium 8.4 mg/dL      BUN/Creatinine Ratio 23.8     Anion Gap 12.1 mmol/L      eGFR 101.3 mL/min/1.73      Comment: National Kidney Foundation and American Society of Nephrology (ASN) Task Force recommended calculation based on the Chronic Kidney Disease Epidemiology Collaboration (CKD-EPI) equation refit without adjustment for race.       Narrative:      GFR Normal >60  Chronic Kidney Disease <60  Kidney Failure <15      Phosphorus [[id number]]  (Abnormal) Collected: 03/17/22 0415    Specimen: Blood Updated: 03/17/22 0533     Phosphorus 2.4 mg/dL     Magnesium [[id number]]  (Normal) Collected: 03/17/22 0415    Specimen: Blood Updated: 03/17/22 0533     Magnesium 2.0 mg/dL     CBC & Differential [[id number]]  (Abnormal) Collected: 03/17/22 0415    Specimen: Blood Updated: 03/17/22 0517    Narrative:      The following orders were created for panel order CBC & Differential.  Procedure                               Abnormality         Status                     ---------                               -----------         ------                     CBC Auto Differential[[id number]]        Abnormal            Final result                 Please view results for these tests on the individual orders.    CBC Auto Differential [[id number]]  (Abnormal) Collected: 03/17/22 0415    Specimen: Blood Updated: 03/17/22 0517     WBC 9.70 10*3/mm3      RBC 3.96 10*6/mm3      Hemoglobin 11.0 g/dL      Hematocrit 34.1 %      MCV 86.1 fL      MCH 27.8 pg      MCHC 32.3 g/dL      RDW 14.5 %      RDW-SD 44.7 fl      MPV 11.2 fL      Platelets 149 10*3/mm3      Neutrophil % 75.9 %       Lymphocyte % 12.0 %      Monocyte % 11.6 %      Eosinophil % 0.1 %      Basophil % 0.1 %      Immature Grans % 0.3 %      Neutrophils, Absolute 7.36 10*3/mm3      Lymphocytes, Absolute 1.16 10*3/mm3      Monocytes, Absolute 1.13 10*3/mm3      Eosinophils, Absolute 0.01 10*3/mm3      Basophils, Absolute 0.01 10*3/mm3      Immature Grans, Absolute 0.03 10*3/mm3      nRBC 0.0 /100 WBC     POC Glucose Once [[id number]]  (Abnormal) Collected: 03/16/22 1728    Specimen: Blood Updated: 03/16/22 1729     Glucose 170 mg/dL      Comment: Serial Number: [id number]:  [id number]       POC Glucose Once [[id number]]  (Abnormal) Collected: 03/16/22 1125    Specimen: Blood Updated: 03/16/22 1128     Glucose 198 mg/dL      Comment: Serial Number: [id number]:  [id number]       Troponin [[id number]]  (Normal) Collected: 03/16/22 1031    Specimen: Blood Updated: 03/16/22 1052     Troponin T <0.010 ng/mL     Narrative:      Troponin T Reference Range:  <= 0.03 ng/mL-   Negative for AMI  >0.03 ng/mL-     Abnormal for myocardial necrosis.  Clinicians would have to utilize clinical acumen, EKG, Troponin and serial changes to determine if it is an Acute Myocardial Infarction or myocardial injury due to an underlying chronic condition.       Results may be falsely decreased if patient taking Biotin.      Blood Culture - Blood, Hand, Right [[id number]] Collected: 03/16/22 0915    Specimen: Blood from Hand, Right Updated: 03/16/22 0937    Lactic Acid, Plasma [[id number]]  (Normal) Collected: 03/16/22 0904    Specimen: Blood Updated: 03/16/22 0928     Lactate 0.8 mmol/L     Blood Culture - Blood, Hand, Left [[id number]] Collected: 03/16/22 0904    Specimen: Blood from Hand, Left Updated: 03/16/22 0910    POC Glucose Once [[id number]]  (Abnormal) Collected: 03/16/22 0735    Specimen: Blood Updated: 03/16/22 0736     Glucose 221 mg/dL      Comment: Serial Number: [id number]:  [id number]       Urine Culture - Urine, Urine, Clean Catch  [[id number]] Collected: 03/16/22 0326    Specimen: Urine, Clean Catch Updated: 03/16/22 0721    aPTT [[id number]]  (Abnormal) Collected: 03/16/22 0220    Specimen: Blood Updated: 03/16/22 0627     PTT 20.6 seconds     Protime-INR [[id number]]  (Abnormal) Collected: 03/16/22 0220    Specimen: Blood Updated: 03/16/22 0627     Protime 10.6 Seconds      INR 1.01    Narrative:      Suggested Therapeutic Ranges For Oral Anticoagulant Therapy:  Level of Therapy                      INR Target Range  Standard Dose                            2.0-3.0  High Dose                                2.5-3.5  Patients not receiving anticoagulant  Therapy Normal Range                     0.6-1.2    Urinalysis, Microscopic Only - Urine, Clean Catch [[id number]]  (Abnormal) Collected: 03/16/22 0326    Specimen: Urine, Clean Catch Updated: 03/16/22 0403     RBC, UA 6-12 /HPF      WBC, UA Too Numerous to Count /HPF      Bacteria, UA 4+ /HPF      Squamous Epithelial Cells, UA 0-2 /HPF      Hyaline Casts, UA None Seen /LPF      Triple Phosphate Crystals, UA Small/1+ /HPF      Methodology Manual Light Microscopy    Urinalysis With Microscopic If Indicated (No Culture) - Urine, Clean Catch [[id number]]  (Abnormal) Collected: 03/16/22 0326    Specimen: Urine, Clean Catch Updated: 03/16/22 0350     Color, UA Dark Yellow     Appearance, UA Turbid     pH, UA 8.0     Specific Gravity, UA >=1.030     Glucose, UA Negative     Ketones, UA Negative     Bilirubin, UA Negative     Blood, UA Negative     Protein,  mg/dL (2+)     Leuk Esterase, UA Large (3+)     Nitrite, UA Positive     Urobilinogen, UA 1.0 E.U./dL    Comprehensive Metabolic Panel [[id number]]  (Abnormal) Collected: 03/16/22 0220    Specimen: Blood Updated: 03/16/22 0244     Glucose 206 mg/dL      BUN 15 mg/dL      Creatinine 1.01 mg/dL      Sodium 130 mmol/L      Potassium 3.4 mmol/L      Comment: Slight hemolysis detected by analyzer. Results may be affected.        Chloride 93 mmol/L       CO2 24.3 mmol/L      Calcium 8.7 mg/dL      Total Protein 7.6 g/dL      Albumin 3.20 g/dL      ALT (SGPT) 22 U/L      AST (SGOT) 19 U/L      Alkaline Phosphatase 74 U/L      Total Bilirubin 0.6 mg/dL      Globulin 4.4 gm/dL      A/G Ratio 0.7 g/dL      BUN/Creatinine Ratio 14.9     Anion Gap 12.7 mmol/L      eGFR 85.1 mL/min/1.73      Comment: National Kidney Foundation and American Society of Nephrology (ASN) Task Force recommended calculation based on the Chronic Kidney Disease Epidemiology Collaboration (CKD-EPI) equation refit without adjustment for race.       Narrative:      GFR Normal >60  Chronic Kidney Disease <60  Kidney Failure <15      Lipase [[id number]]  (Normal) Collected: 03/16/22 0220    Specimen: Blood Updated: 03/16/22 0244     Lipase 19 U/L     [place] Draw [[id number]] Collected: 03/16/22 0220    Specimen: Blood Updated: 03/16/22 0241    Narrative:      The following orders were created for panel order [place] Draw.  Procedure                               Abnormality         Status                     ---------                               -----------         ------                     Green Top (Gel)[[id number]]                                  Final result               Lavender Top[[id number]]                                     Final result               Gold Top - SST[[id number]]                                   Final result               Light Blue Top[[id number]]                                   Final result                 Please view results for these tests on the individual orders.    Gold Top - SST [[id number]] Collected: 03/16/22 0220    Specimen: Blood Updated: 03/16/22 0241     Extra Tube Hold Specimen    Light Blue Top [[id number]] Collected: 03/16/22 0220    Specimen: Blood Updated: 03/16/22 0241     Extra Tube Hold Specimen    Lavender Top [[id number]] Collected: 03/16/22 0220    Specimen: Blood Updated: 03/16/22 0241     Extra Tube Hold Specimen    Green Top (Gel) [[id number]]  Collected: 03/16/22 0220    Specimen: Blood Updated: 03/16/22 0241     Extra Tube Hold Specimen    CBC & Differential [[id number]]  (Abnormal) Collected: 03/16/22 0220    Specimen: Blood Updated: 03/16/22 0227    Narrative:      The following orders were created for panel order CBC & Differential.  Procedure                               Abnormality         Status                     ---------                               -----------         ------                     CBC Auto Differential[[id number]]        Abnormal            Final result                 Please view results for these tests on the individual orders.    CBC Auto Differential [[id number]]  (Abnormal) Collected: 03/16/22 0220    Specimen: Blood Updated: 03/16/22 0227     WBC 11.27 10*3/mm3      RBC 4.13 10*6/mm3      Hemoglobin 12.0 g/dL      Hematocrit 35.6 %      MCV 86.2 fL      MCH 29.1 pg      MCHC 33.7 g/dL      RDW 14.5 %      RDW-SD 45.2 fl      MPV 11.1 fL      Platelets 161 10*3/mm3      Neutrophil % 78.7 %      Lymphocyte % 11.1 %      Monocyte % 9.7 %      Eosinophil % 0.1 %      Basophil % 0.1 %      Immature Grans % 0.3 %      Neutrophils, Absolute 8.88 10*3/mm3      Lymphocytes, Absolute 1.25 10*3/mm3      Monocytes, Absolute 1.09 10*3/mm3      Eosinophils, Absolute 0.01 10*3/mm3      Basophils, Absolute 0.01 10*3/mm3      Immature Grans, Absolute 0.03 10*3/mm3      nRBC 0.0 /100 WBC           IMAGING:  Imaging Results (Last 72 Hours)     Procedure Component Value Units Date/Time    XR Abdomen KUB [[id number]] Collected: 03/16/22 1825     Updated: 03/16/22 1828    Narrative:      PROCEDURE: XR ABDOMEN KUB     COMPARISON: [facility], CR, XR ABDOMEN 2+ VIEWS W CHEST 1 VW, 10/19/2021, 11:27.     INDICATIONS: Gastrografin challenge     FINDINGS:   There is contrast material seen in the fundus of the stomach.  The stomach is dilated with gas.    There is also increased gas seen within the visualized small bowel which appears  dilated measuring   up to 5.7 cm in diameter.  The lower pelvis was not included in the field of view.  The right flank   was also excluded from the field of view.  The visualized lung bases are clear.  There is   spondylosis of the thoracic spine.       Impression:       There is contrast material within the stomach.  The bowel gas pattern is abnormal.            LUIS FERNANDO NAVARRO MD         Electronically Signed and Approved By: LUIS FERNANDO NAVARRO MD on 3/16/2022 at 18:25                     XR Chest 1 View [[id number]] Collected: 03/16/22 1020     Updated: 03/16/22 1023    Narrative:      PROCEDURE: XR CHEST 1 VW     COMPARISON: [facility], CR, XR ABDOMEN 2+ VIEWS W CHEST 1 VW, 10/19/2021, 11:27.     INDICATIONS: hypoxia, soa, cough     FINDINGS:   Stable cardiomegaly.  No dense consolidation.  No pleural fluid or pneumothorax.       Impression:       No acute change from 10/19/2021                  ROBIN URIBE MD         Electronically Signed and Approved By: ROBIN URIBE MD on 3/16/2022 at 10:20                     CT Abdomen Pelvis With Contrast [[id number]] Collected: 03/16/22 0337     Updated: 03/16/22 0340    Narrative:      PROCEDURE: CT ABDOMEN PELVIS W CONTRAST     COMPARISON: 10/19/2021.     INDICATIONS: DIFFUSE ABDOMINAL PAIN; NAUSEA; VOMITING X 4 DAYS.     TECHNIQUE: After obtaining the patient's consent, 695 CT images were created with non-ionic   intravenous contrast material.       PROTOCOL:   Standard imaging protocol performed      RADIATION:   DLP: 3,064.1 mGy*cm    Automated exposure control was utilized to minimize radiation dose.   CONTRAST: 100 mL Isovue 370 I.V.     FINDINGS: There is new distension of the stomach and the proximal small bowel.  The maximum   diameter of the proximal small bowel is about 7 cm.  The maximum transverse diameter of the stomach   is about 12 cm.  There is a relative transition zone in the caliber of the small bowel within the   lower central posterior  abdomen/upper pelvis, as seen on image 106 of series 205, image 97 of   series 201, and image 130 of series 206.  Mild mural thickening involving the proximal small bowel   is also seen.  Fecal like content is seen in the lumen of the proximal dilated small bowel.    Etiologies for the suspected small bowel obstruction are many and would include an adhesion.    Infectious/inflammatory bowel disease an associated stricture is possible.  An ischemic or   malignant process (or processes) cannot be excluded.  These possibilities are thought to be less   likely.  A volvulus or internal hernia or closed loop obstruction cannot be excluded but are   thought to be less likely etiologies.  No pneumoperitoneum or pneumatosis.  No portal or mesenteric   venous gas is seen.  No extraluminal intraperitoneal or retroperitoneal fluid collections are seen   to suggest abscess.  The more distal small bowel is nondilated.  The appendix is not clearly   identified.  Please correlate the surgical history.  The colon appears less distended with gas and   stool.  Probably no acute colitis.  No acute diverticulitis.  The patient has undergone   cholecystectomy.  There is pancreatic lipomatosis.  No acute pancreatitis.  Diffuse hepatic   steatosis is seen with hepatomegaly.  There is splenomegaly.  No adrenal mass.  No renal/ureteral   stones or hydronephrosis or obstructive uropathy.  No acute pyelonephritis.  A urinary bladder   catheter is in place.  The urinary bladder is underdistended.  Surgical clips are identified in the   left inguinal region.  There are small to moderate sized bilateral inguinal lymph nodes, which are   nonspecific.  No aneurysmal dilatation of the abdominal aorta.  Probably no cardiac enlargement is   present.  Coronary artery calcifications are seen.  There is chronic calcified granulomatous   disease of the chest, spleen, and liver.  No definite pleural effusion.  No acute infiltrate is   seen in the partially  imaged lung bases.  The previously described nodular infiltrate in the lower   lobe of the right lung is not identified.  It may have since resolved.  Degenerative changes are   seen throughout the imaged spine.  There may be diffuse idiopathic skeletal hyperostosis (DISH).    Degenerative changes involve the hip joints and the bilateral sacroiliac joints.  No acute   fracture.  No aggressive osseous lesion is suggested.  Chronic anterolisthesis is seen at L4-5.    Chronic retrolisthesis is seen at L5-S1.  Similar findings were seen previously.       Impression:            1. There is a suspected new partial versus complete mechanical small bowel obstruction with a   transition zone involving small bowel in the posterior mid lower abdomen/upper pelvis, as   discussed.  No pneumoperitoneum or pneumatosis.  No portal or mesenteric venous gas.  No focal   sizable (drainable) extraluminal intraperitoneal or retroperitoneal fluid collection is seen to   suggest an abscess.  Consider close interval clinical and imaging follow-up of the findings to   ensure a benign progression and to exclude an underlying malignant process, which is thought to be   less likely.     2. Please see above comments for further detail.             OVIDIO GLOVER JR, MD         Electronically Signed and Approved By: OVIDIO GLOVER JR, MD on 3/16/2022 at 3:37                           Medications:    Current Facility-Administered Medications:   •  acetaminophen (TYLENOL) tablet 650 mg, 650 mg, Oral, Q4H PRN **OR** acetaminophen (TYLENOL) 160 MG/5ML solution 650 mg, 650 mg, Oral, Q4H PRN **OR** acetaminophen (TYLENOL) suppository 650 mg, 650 mg, Rectal, Q4H PRN, Gerardo Luis MD  •  amitriptyline (ELAVIL) tablet 25 mg, 25 mg, Oral, Nightly, Gerardo Luis MD  •  benztropine (COGENTIN) injection 1 mg, 1 mg, Intravenous, BID, Kyrie Kilgore MD, 1 mg at 03/16/22 1021  •  busPIRone (BUSPAR) tablet 10 mg, 10 mg, Oral, TID, Magui  MD Kyrie  •  cefTRIAXone (ROCEPHIN) IVPB 1 g, 1 g, Intravenous, Q24H, Kyrie Kilgore MD  •  dextrose (GLUTOSE) oral gel 15 g, 15 g, Oral, Q15 Min PRN, Kyrie Kilgore MD  •  dextrose 10 % infusion, 25 g, Intravenous, Q15 Min PRN, Kyrie Kilgore MD  •  DULoxetine (CYMBALTA) DR capsule 60 mg, 60 mg, Oral, Daily, Gerardo Luis MD  •  gabapentin (NEURONTIN) capsule 800 mg, 800 mg, Oral, Q8H, Gerardo Luis MD  •  glucagon (human recombinant) (GLUCAGEN DIAGNOSTIC) injection 1 mg, 1 mg, Subcutaneous, Q15 Min PRN, Kyrie Kilgore MD  •  insulin lispro (humaLOG) injection 0-9 Units, 0-9 Units, Subcutaneous, TID AC, Kyrie Kilgore MD, 4 Units at 03/16/22 0848  •  levothyroxine (SYNTHROID, LEVOTHROID) tablet 25 mcg, 25 mcg, Oral, Q AM, Kyrie Kilgore MD  •  morphine injection 1 mg, 1 mg, Intravenous, Q4H PRN, 1 mg at 03/16/22 0621 **AND** naloxone (NARCAN) injection 0.4 mg, 0.4 mg, Intravenous, Q5 Min PRN, Gerardo Luis MD  •  morphine injection 3 mg, 3 mg, Intravenous, Q4H PRN, Kyrie Kilgore MD, 3 mg at 03/16/22 2047  •  ondansetron (ZOFRAN) injection 4 mg, 4 mg, Intravenous, Q4H PRN, Kyrie Kilgore MD, 4 mg at 03/17/22 0440  •  pantoprazole (PROTONIX) injection 40 mg, 40 mg, Intravenous, Q AM, Kyrie Kilgore MD, 40 mg at 03/17/22 0619  •  sodium chloride 0.9 % flush 10 mL, 10 mL, Intravenous, Q12H, Gerardo Luis MD, 10 mL at 03/16/22 2340  •  sodium chloride 0.9 % with KCl 20 mEq/L infusion, 100 mL/hr, Intravenous, Continuous, Kyrie Kilgore MD, Last Rate: 100 mL/hr at 03/17/22 0441, 100 mL/hr at 03/17/22 0441    Assessment/Plan       Intestinal obstruction, unspecified cause, unspecified whether partial or complete (HCC)    Partial small bowel obstruction versus small bowel obstruction     Gastrografin challenge x-ray reviewed with no contrast seen in the colon.  Start clear liquid diet.  Advance as tolerated.  Okay to discharge home if tolerates diet without nausea  vomiting.    Hal Wagner MD  03/17/22  07:46 EDT

## 2022-03-17 NOTE — DISCHARGE SUMMARY
[facility]         HOSPITALIST  DISCHARGE SUMMARY    Patient Name: Maynor Geiger  : 1960  MRN: [id number]    Date of Admission: 3/16/2022  Date of Discharge:  22    *dc updated to 3/18 discharge as patient discharge did not occur on 3/17     Primary Care Physician: Deshaun Saucedo MD    Consults     Date and Time Order Name Status Description    3/16/2022  5:58 AM Inpatient General Surgery Consult      3/16/2022  5:41 AM Hospitalist (on-call MD unless specified)      3/16/2022  5:36 AM Surgery (on-call MD unless specified) Completed           Final Diagnosis:  Small Bowel Obstruction  Gram Negative Bacilli UTI, complicated in male with recurrent uti  Abdominal Pain  Hypokalemia  Hypophosphatemia  Severe Morbid Obesity BMI 57  Chronic urinary retention with chronic vuong catheter  HTN  Anxiety/depression       Hospital Course     Hospital Course:  62 y.o. year old morbidly obese male  with multiple chronic medical issues including BMI 57, diabetes and chronic indwelling Vuong presents with generalized abdominal pain and vomiting since Saturday.  Found to have possible obstruction and was evaluated by general surgery/Dr. Wagner. Pt declined NG placement but fortunately symptoms improved with supportive care and bowel rest.  Patient had several bowel movements prior to discharge, was tolerating po intake, and was without further acute abdominal pain/discomfort.  Patient with multiple bowel medications as outpatient including anti-diarrheals.  These were adjusted as below.     Patient was treated with ceftriaxone for uti, his vuong catheter was exchanged to a clean one.  His flomax was discontinued as he has a continuous vuong catheter.  Urine culture with 100k gram negative bacilli speciation and sensitivity pending. Patient transitioned to cefdinir for discharge.     Patient discharged in stable condition with slowly advancing diet back to [facility].    Return  precautions and follow up discussed and patient voiced agreement and understanding of treatment plan.     DISCHARGE Follow Up Recommendations for labs and diagnostics:   -f/u bowel regimen and abdominal pain  -f/u urine culture sensitivity and speciation      CODE STATUS:  Code Status and Medical Interventions:   Ordered at: 03/16/22 1932     Medical Intervention Limits:    NO intubation (DNI)    NO cardioversion     Code Status (Patient has no pulse and is not breathing):    No CPR (Do Not Attempt to Resuscitate)     Medical Interventions (Patient has pulse or is breathing):    Limited Support           Day of Discharge     Vital Signs:  Temp:  [97.9 °F (36.6 °C)-99.5 °F (37.5 °C)] 98.9 °F (37.2 °C)  Heart Rate:  [] 99  Resp:  [18] 18  BP: (114-135)/(52-68) 135/68    Physical Exam  Gen: awake, resting in bed, conversant,   HENT: NCAT, mmm  Resp: CTAB, normal respiratory effort  CV: RRR, no LE pitting edema  GI: Abdomen soft, NT, ND, morbidly obese, no guarding, +BS  Psych: appropriate mood and affect, aox3  Skin: warm, dry    Discharge Details        Discharge Medications      New Medications      Instructions Start Date   acetaminophen 325 MG tablet  Commonly known as: TYLENOL   650 mg, Oral, Every 4 Hours PRN      cefdinir 300 MG capsule  Commonly known as: OMNICEF   300 mg, Oral, 2 Times Daily   Start Date: March 18, 2022     cholestyramine 4 g packet  Commonly known as: Questran   1 packet, Oral, Daily PRN         Changes to Medications      Instructions Start Date   amitriptyline 25 MG tablet  Commonly known as: ELAVIL  What changed:   · medication strength  · how much to take  · when to take this   25 mg, Oral, Nightly      metoclopramide 10 MG tablet  Commonly known as: REGLAN  What changed:   · when to take this  · reasons to take this   10 mg, Oral, 3 Times Daily PRN         Continue These Medications      Instructions Start Date   benztropine 1 MG tablet  Commonly known as: COGENTIN   1 mg, Oral, 2  times daily      busPIRone 10 MG tablet  Commonly known as: BUSPAR   10 mg, Oral, 3 Times Daily      cyanocobalamin 1000 MCG/ML injection   1,000 mcg, Intramuscular, Every 7 Days      dicyclomine 10 MG capsule  Commonly known as: BENTYL   10 mg, Oral, 3 Times Daily Before Meals      dry mouth gel gel   1 application, Mouth/Throat, Every 6 Hours PRN      DULoxetine 60 MG capsule  Commonly known as: CYMBALTA   60 mg, Oral, Daily      gabapentin 800 MG tablet  Commonly known as: NEURONTIN   800 mg, Oral, 3 Times Daily      lactulose 10 GM/15ML solution  Commonly known as: CHRONULAC   30 mL, Oral, Daily PRN      levothyroxine 25 MCG tablet  Commonly known as: SYNTHROID, LEVOTHROID   25 mcg, Oral, Every Early Morning      Lidocaine 5 % cream   1 application, Topical, Nightly PRN      Melatonin 10 MG tablet   10 mg, Oral, Nightly      methenamine 1 g tablet  Commonly known as: HIPREX   1 g, Oral, 2 Times Daily With Meals      multivitamin with minerals tablet tablet   1 tablet, Oral, Daily      ondansetron 4 MG tablet  Commonly known as: ZOFRAN   4 mg, Oral, Every 6 Hours PRN      pantoprazole 20 MG EC tablet  Commonly known as: PROTONIX   20 mg, Oral, Daily         Stop These Medications    Chloraseptic Max Sore Throat 1.5-33 % liquid  Generic drug: Phenol-Glycerin     diphenoxylate-atropine 2.5-0.025 MG per tablet  Commonly known as: LOMOTIL     ibuprofen 800 MG tablet  Commonly known as: ADVIL,MOTRIN     lisinopril 5 MG tablet  Commonly known as: PRINIVIL,ZESTRIL     loperamide 2 MG tablet  Commonly known as: IMODIUM A-D     potassium chloride 20 MEQ CR tablet  Commonly known as: K-DUR,KLOR-CON     tamsulosin 0.4 MG capsule 24 hr capsule  Commonly known as: FLOMAX              Discharge Disposition:  Rehab Facility or Unit (DC - External)    Diet: advance as tolerated; needs weight loss     Discharge Activity: advance as tolerated    Future Appointments   Date Time Provider Department Center   3/29/2022 11:00 AM  Venu Mack MD [facility] [place]           Pertinent  and/or Most Recent Results       LAB RESULTS:      Lab 03/17/22  0415 03/16/22  0904 03/16/22 0220   WBC 9.70  --  11.27*   HEMOGLOBIN 11.0*  --  12.0*   HEMATOCRIT 34.1*  --  35.6*   PLATELETS 149  --  161   NEUTROS ABS 7.36*  --  8.88*   IMMATURE GRANS (ABS) 0.03  --  0.03   LYMPHS ABS 1.16  --  1.25   MONOS ABS 1.13*  --  1.09*   EOS ABS 0.01  --  0.01   MCV 86.1  --  86.2   LACTATE  --  0.8  --    PROTIME  --   --  10.6   APTT  --   --  20.6*         Lab 03/17/22  0714 03/17/22 0415 03/16/22 0220   SODIUM 134*  --  130*   POTASSIUM 3.9  --  3.4*   CHLORIDE 99  --  93*   CO2 22.9  --  24.3   ANION GAP 12.1  --  12.7   BUN 19  --  15   CREATININE 0.80  --  1.01   EGFR 101.3  --  85.1   GLUCOSE 171*  --  206*   CALCIUM 8.4*  --  8.7   MAGNESIUM  --  2.0  --    PHOSPHORUS  --  2.4*  --          Lab 03/16/22 0220   TOTAL PROTEIN 7.6   ALBUMIN 3.20*   GLOBULIN 4.4   ALT (SGPT) 22   AST (SGOT) 19   BILIRUBIN 0.6   ALK PHOS 74   LIPASE 19         Lab 03/16/22  1031 03/16/22 0220   TROPONIN T <0.010  --    PROTIME  --  10.6   INR  --  1.01*                 Brief Urine Lab Results  (Last result in the past 365 days)      Color   Clarity   Blood   Leuk Est   Nitrite   Protein   CREAT   Urine HCG        03/16/22 0326 Dark Yellow   Turbid   Negative   Large (3+)   Positive   100 mg/dL (2+)               Microbiology Results (last 10 days)     Procedure Component Value - Date/Time    Blood Culture - Blood, Hand, Right [[id number]]  (Normal) Collected: 03/16/22 0915    Lab Status: Preliminary result Specimen: Blood from Hand, Right Updated: 03/17/22 0948     Blood Culture No growth at 24 hours    Blood Culture - Blood, Hand, Left [[id number]]  (Normal) Collected: 03/16/22 0904    Lab Status: Preliminary result Specimen: Blood from Hand, Left Updated: 03/17/22 0917     Blood Culture No growth at 24 hours    Urine Culture - Urine, Urine, Clean Catch [[id number]]   (Abnormal) Collected: 03/16/22 0326    Lab Status: Preliminary result Specimen: Urine, Clean Catch Updated: 03/17/22 1128     Urine Culture >100,000 CFU/mL Gram Negative Bacilli          RADIOLOGY:  CT Abdomen Pelvis With Contrast    Result Date: 3/16/2022  Impression:    1. There is a suspected new partial versus complete mechanical small bowel obstruction with a transition zone involving small bowel in the posterior mid lower abdomen/upper pelvis, as discussed.  No pneumoperitoneum or pneumatosis.  No portal or mesenteric venous gas.  No focal sizable (drainable) extraluminal intraperitoneal or retroperitoneal fluid collection is seen to suggest an abscess.  Consider close interval clinical and imaging follow-up of the findings to ensure a benign progression and to exclude an underlying malignant process, which is thought to be less likely.  2. Please see above comments for further detail.     OVIDIO GLOVER JR, MD       Electronically Signed and Approved By: OVIDIO GLOVER JR, MD on 3/16/2022 at 3:37             XR Chest 1 View    Result Date: 3/16/2022  Impression:  No acute change from 10/19/2021       ROBIN URIBE MD       Electronically Signed and Approved By: ROBIN URIBE MD on 3/16/2022 at 10:20             XR Abdomen KUB    Result Date: 3/17/2022  Impression:  Diffuse gaseous distention of small and large bowel as well as the stomach.  Small bowel loops measure up to 7.4 cm.  Oral contrast material seen in the colon.  Findings could represent ileus.  Given the appearance of the small bowel, small bowel obstruction is difficult to exclude.     ROBIN URIBE MD       Electronically Signed and Approved By: ROBIN URIBE MD on 3/17/2022 at 11:08             XR Abdomen KUB    Result Date: 3/16/2022  Impression:  There is contrast material within the stomach.  The bowel gas pattern is abnormal.     LUIS FERNANDO NAVARRO MD       Electronically Signed and Approved By: LUIS FERNANDO NAVARRO MD on 3/16/2022 at 18:25                            Labs Pending at Discharge:  Pending Labs     Order Current Status    Blood Culture - Blood, Hand, Left Preliminary result    Blood Culture - Blood, Hand, Right Preliminary result    Urine Culture - Urine, Urine, Clean Catch Preliminary result            Time spent on Discharge including face to face service:  45 minutes

## 2022-03-17 NOTE — SIGNIFICANT NOTE
03/16/22 1140   Coping/Psychosocial   Observed Emotional State calm;cooperative   Verbalized Emotional State hopefulness   Trust Relationship/Rapport empathic listening provided   Involvement in Care interacting with patient   Additional Documentation Spiritual Care (Group)   Spiritual Care   Use of Spiritual Resources non-Alevism use of spiritual care   Spiritual Care Source  initiative   Spiritual Care Follow-Up follow-up, none required as presently assessed   Response to Spiritual Care engaged in conversation;receptive of support;thanks expressed   Spiritual Care Interventions supportive conversation provided   Spiritual Care Visit Type initial   Receptivity to Spiritual Care visit welcomed

## 2022-03-18 VITALS
OXYGEN SATURATION: 96 % | WEIGHT: 315 LBS | RESPIRATION RATE: 18 BRPM | BODY MASS INDEX: 50.62 KG/M2 | HEIGHT: 66 IN | HEART RATE: 85 BPM | SYSTOLIC BLOOD PRESSURE: 123 MMHG | DIASTOLIC BLOOD PRESSURE: 58 MMHG | TEMPERATURE: 98.5 F

## 2022-03-18 LAB
ANION GAP SERPL CALCULATED.3IONS-SCNC: 8.4 MMOL/L (ref 5–15)
BACTERIA SPEC AEROBE CULT: ABNORMAL
BASOPHILS # BLD AUTO: 0.01 10*3/MM3 (ref 0–0.2)
BASOPHILS NFR BLD AUTO: 0.2 % (ref 0–1.5)
BUN SERPL-MCNC: 12 MG/DL (ref 8–23)
BUN/CREAT SERPL: 14.8 (ref 7–25)
CALCIUM SPEC-SCNC: 8.5 MG/DL (ref 8.6–10.5)
CHLORIDE SERPL-SCNC: 96 MMOL/L (ref 98–107)
CO2 SERPL-SCNC: 25.6 MMOL/L (ref 22–29)
CREAT SERPL-MCNC: 0.81 MG/DL (ref 0.76–1.27)
DEPRECATED RDW RBC AUTO: 46.7 FL (ref 37–54)
EGFRCR SERPLBLD CKD-EPI 2021: 100.9 ML/MIN/1.73
EOSINOPHIL # BLD AUTO: 0.01 10*3/MM3 (ref 0–0.4)
EOSINOPHIL NFR BLD AUTO: 0.2 % (ref 0.3–6.2)
ERYTHROCYTE [DISTWIDTH] IN BLOOD BY AUTOMATED COUNT: 14.6 % (ref 12.3–15.4)
GLUCOSE SERPL-MCNC: 145 MG/DL (ref 65–99)
HCT VFR BLD AUTO: 33.5 % (ref 37.5–51)
HGB BLD-MCNC: 10.8 G/DL (ref 13–17.7)
IMM GRANULOCYTES # BLD AUTO: 0.01 10*3/MM3 (ref 0–0.05)
IMM GRANULOCYTES NFR BLD AUTO: 0.2 % (ref 0–0.5)
LYMPHOCYTES # BLD AUTO: 1.49 10*3/MM3 (ref 0.7–3.1)
LYMPHOCYTES NFR BLD AUTO: 23.1 % (ref 19.6–45.3)
MAGNESIUM SERPL-MCNC: 1.9 MG/DL (ref 1.6–2.4)
MCH RBC QN AUTO: 28.4 PG (ref 26.6–33)
MCHC RBC AUTO-ENTMCNC: 32.2 G/DL (ref 31.5–35.7)
MCV RBC AUTO: 88.2 FL (ref 79–97)
MONOCYTES # BLD AUTO: 0.74 10*3/MM3 (ref 0.1–0.9)
MONOCYTES NFR BLD AUTO: 11.5 % (ref 5–12)
NEUTROPHILS NFR BLD AUTO: 4.18 10*3/MM3 (ref 1.7–7)
NEUTROPHILS NFR BLD AUTO: 64.8 % (ref 42.7–76)
NRBC BLD AUTO-RTO: 0 /100 WBC (ref 0–0.2)
PHOSPHATE SERPL-MCNC: 2 MG/DL (ref 2.5–4.5)
PLATELET # BLD AUTO: 139 10*3/MM3 (ref 140–450)
PMV BLD AUTO: 10.8 FL (ref 6–12)
POTASSIUM SERPL-SCNC: 3.3 MMOL/L (ref 3.5–5.2)
RBC # BLD AUTO: 3.8 10*6/MM3 (ref 4.14–5.8)
SODIUM SERPL-SCNC: 130 MMOL/L (ref 136–145)
WBC NRBC COR # BLD: 6.44 10*3/MM3 (ref 3.4–10.8)

## 2022-03-18 PROCEDURE — 80048 BASIC METABOLIC PNL TOTAL CA: CPT | Performed by: INTERNAL MEDICINE

## 2022-03-18 PROCEDURE — 83735 ASSAY OF MAGNESIUM: CPT | Performed by: INTERNAL MEDICINE

## 2022-03-18 PROCEDURE — 99232 SBSQ HOSP IP/OBS MODERATE 35: CPT | Performed by: INTERNAL MEDICINE

## 2022-03-18 PROCEDURE — 85025 COMPLETE CBC W/AUTO DIFF WBC: CPT | Performed by: INTERNAL MEDICINE

## 2022-03-18 PROCEDURE — 84100 ASSAY OF PHOSPHORUS: CPT | Performed by: INTERNAL MEDICINE

## 2022-03-18 RX ORDER — POTASSIUM CHLORIDE 750 MG/1
40 CAPSULE, EXTENDED RELEASE ORAL ONCE
Status: COMPLETED | OUTPATIENT
Start: 2022-03-18 | End: 2022-03-18

## 2022-03-18 RX ORDER — BENZTROPINE MESYLATE 1 MG/1
1 TABLET ORAL EVERY 12 HOURS SCHEDULED
Status: DISCONTINUED | OUTPATIENT
Start: 2022-03-18 | End: 2022-03-18 | Stop reason: HOSPADM

## 2022-03-18 RX ADMIN — POTASSIUM CHLORIDE 40 MEQ: 750 CAPSULE, EXTENDED RELEASE ORAL at 11:00

## 2022-03-18 RX ADMIN — Medication 10 ML: at 11:01

## 2022-03-18 RX ADMIN — GABAPENTIN 800 MG: 400 CAPSULE ORAL at 06:19

## 2022-03-18 RX ADMIN — POTASSIUM & SODIUM PHOSPHATES POWDER PACK 280-160-250 MG 1 PACKET: 280-160-250 PACK at 11:01

## 2022-03-18 RX ADMIN — PANTOPRAZOLE SODIUM 40 MG: 40 INJECTION, POWDER, LYOPHILIZED, FOR SOLUTION INTRAVENOUS at 06:19

## 2022-03-18 RX ADMIN — BENZTROPINE MESYLATE 1 MG: 1 TABLET ORAL at 11:54

## 2022-03-18 RX ADMIN — BUSPIRONE HYDROCHLORIDE 10 MG: 10 TABLET ORAL at 11:00

## 2022-03-18 RX ADMIN — LEVOTHYROXINE SODIUM 25 MCG: 0.03 TABLET ORAL at 06:19

## 2022-03-18 RX ADMIN — DULOXETINE HYDROCHLORIDE 60 MG: 30 CAPSULE, DELAYED RELEASE ORAL at 11:00

## 2022-03-18 NOTE — NURSING NOTE
Patient refused fingerstick blood sugar.  Patient educated regarding risks of not having blood sugar checked but adamantly refused.  Patient remains alert and oriented.

## 2022-03-18 NOTE — SIGNIFICANT NOTE
03/18/22 0833   Plan   Final Discharge Disposition Code 03 - skilled nursing facility (SNF)   Final Note Patient is good to discharge to [facility] today.

## 2022-03-18 NOTE — PLAN OF CARE
Goal Outcome Evaluation:  Patient is alert and oriented.  Patient is reports being ready to return to his previous facility.  Patient has no s/s of acute distress noted. Patient denies questions or concerns at this time.  EMS notified of need for transport.  Report called to Stephanie at [facility].

## 2022-03-18 NOTE — PROGRESS NOTES
[facility]   Hospitalist Progress Note    Date of admission: 3/16/2022  Patient Name: Maynor Geiger  1960  Date: 3/18/2022      Subjective     Cc: abdominal pain and vomiting    Interval Followup: did well overnight. Tolerating increasing diet.  +BM.  No n/v.  No dysuyria.  Discharging today did not transpire yesterday after order placed.      Review of Systems  No chest pain or palpitations  No fevers or chills      Objective     Vitals:   Temp:  [97.8 °F (36.6 °C)-98.6 °F (37 °C)] 98.2 °F (36.8 °C)  Heart Rate:  [83-89] 83  Resp:  [18] 18  BP: (115-132)/(56-61) 115/56    Physical Exam  Gen: awake, resting in bed, conversant  HENT: NCAT, mmm  Resp: CTAB, normal respiratory effort  CV: RRR, no LE pitting edema  GI: Abdomen soft, obese, NT, ND, no guarding, +BS  Psych: appropriate mood and affect, aox3  Skin: warm, dry    Result Review:  Vital signs, labs and any relevant imaging reviewed.        Assessment / Plan     Assessment/Plan:  Small Bowel Obstruction  Gram Negative Bacilli UTI, complicated in male with recurrent uti  Abdominal Pain  Hypokalemia  Hypophosphatemia  Severe Morbid Obesity BMI 57  Chronic urinary retention with chronic vuong catheter  HTN  Anxiety/depression     -replace potassium and phos which are mildly low today; suspect will be fine going forward given improving appetite  -prn zofran if needed  -cont prn pain control  -bp stable without home antihtn; cont to hold  -cont cefdinir abx, uti culture not resulted but will follow up and adjust as needed  -cont cymbalta, elavil and buspar  -cont cogentin  -cont gabapentin  -cont synthroid  -cont pantoprazole   -dc further labs  -d/w rn to dc today  -rest per orders    DC today    DVT prophylaxis:  Mechanical DVT prophylaxis orders are present.    Medical Intervention Limits: NO intubation (DNI); NO cardioversion  Code Status (Patient has no pulse and is not breathing): No CPR (Do Not Attempt to Resuscitate)  Medical Interventions (Patient has  pulse or is breathing): Limited Support

## 2022-03-21 LAB
BACTERIA SPEC AEROBE CULT: NORMAL
BACTERIA SPEC AEROBE CULT: NORMAL

## 2022-03-27 PROBLEM — N40.1 BENIGN PROSTATIC HYPERPLASIA WITH URINARY FREQUENCY: Status: ACTIVE | Noted: 2022-03-27

## 2022-03-27 PROBLEM — R35.0 BENIGN PROSTATIC HYPERPLASIA WITH URINARY FREQUENCY: Status: ACTIVE | Noted: 2022-03-27
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
## 2022-03-28 DIAGNOSIS — Z12.5 PROSTATE CANCER SCREENING: Primary | ICD-10-CM

## 2022-03-29 ENCOUNTER — OFFICE VISIT (OUTPATIENT)
Dept: UROLOGY | Facility: CLINIC | Age: 62
End: 2022-03-29

## 2022-03-29 VITALS — WEIGHT: 315 LBS | BODY MASS INDEX: 50.62 KG/M2 | RESPIRATION RATE: 17 BRPM | HEIGHT: 66 IN

## 2022-03-29 DIAGNOSIS — N43.3 HYDROCELE IN ADULT: ICD-10-CM

## 2022-03-29 DIAGNOSIS — R35.0 BENIGN PROSTATIC HYPERPLASIA WITH URINARY FREQUENCY: Primary | ICD-10-CM

## 2022-03-29 DIAGNOSIS — N40.1 BENIGN PROSTATIC HYPERPLASIA WITH URINARY FREQUENCY: Primary | ICD-10-CM

## 2022-03-29 DIAGNOSIS — R33.9 URINARY RETENTION: ICD-10-CM

## 2022-03-29 LAB — SPECIMEN VOL 24H UR: NORMAL L

## 2022-03-29 PROCEDURE — 51798 US URINE CAPACITY MEASURE: CPT | Performed by: UROLOGY

## 2022-03-29 PROCEDURE — 99213 OFFICE O/P EST LOW 20 MIN: CPT | Performed by: UROLOGY

## 2022-04-05 ENCOUNTER — TELEPHONE (OUTPATIENT)
Dept: UROLOGY | Facility: CLINIC | Age: 62
End: 2022-04-05

## 2022-04-05 NOTE — TELEPHONE ENCOUNTER
Belia at [facility] called. The patient wants to go ahead with a TURP that was discussed before. Does he need to be seen again or how does he need to proceed.

## 2022-04-06 NOTE — TELEPHONE ENCOUNTER
Returned Belia's call regarding next steps for patient prior to TURP. According to last office note, patient will need UDS and Cystoscopy prior to scheduling TURP. I got patient scheduled for both UDS and office cysto. UDS 06/23/22 at 0845, cysto 06/27/22 at 1300. Belia asked that I please mail this appt information to the Nursing and Rehabilitation center. I have put these in an envelope and will get it sent out today.

## 2022-04-07 ENCOUNTER — TELEPHONE (OUTPATIENT)
Dept: UROLOGY | Facility: CLINIC | Age: 62
End: 2022-04-07

## 2022-04-07 NOTE — TELEPHONE ENCOUNTER
Spoke with Belia at [facility].  They will draw PSA prior to patient coming in to see us on 6-27-22.

## 2022-06-20 ENCOUNTER — TELEPHONE (OUTPATIENT)
Dept: UROLOGY | Facility: CLINIC | Age: 62
End: 2022-06-20

## 2022-06-20 NOTE — TELEPHONE ENCOUNTER
Tried to call patient to see about moving his appt to 07/01. No answer and no voicemail box available.

## 2023-01-13 ENCOUNTER — TELEPHONE (OUTPATIENT)
Dept: SURGERY | Facility: CLINIC | Age: 63
End: 2023-01-13
Payer: MEDICAID

## 2023-01-13 NOTE — TELEPHONE ENCOUNTER
PT RECALL LETTER WAS RETURNED, [facility] IN [facility] CHART [facility],THE ADDRESS IS FOR THAT FACILITY, SPOKE TO THE  AND SHE STATED THAT PT WAS DISCHARGED A WHILE BACK TO THE [facility] AND THEY DO NOT HAVE A CURRENT ADDRESS, CALLED PT BROTHER THAT IS ON HIS CONTACT LIST TO SEE IF PT HAS ANOTHER NUMBER, NO ANSWER, NO MACHINE, ANYTHING ELSE TO DO?

## 2023-01-19 ENCOUNTER — TELEPHONE (OUTPATIENT)
Dept: UROLOGY | Facility: CLINIC | Age: 63
End: 2023-01-19
Payer: MEDICAID

## 2023-01-19 NOTE — TELEPHONE ENCOUNTER
I called Dr. TERELL Saucedo's office to see if they had updated information on the patient.  They said Dr. Saucedo only sees patient in the nursing home and once pt was released, they do not follow them.  I  have reached out to the [facility] who told me the pt was released to go to the [facility] on June 24th, 2022. They have no further information on him.  I was told I could call the [facility]  and I did and was told they have no history of him being there for several yrs.  I also called the 's office there.  They do have an address in their area and are going to do a welfare ck but would not give me that address.  Tried calling the numbers listed on the account but no answer and no voice mail available.  I believe we have done everything we possibly can to try to reach the patient but have had no success.

## 2023-01-20 ENCOUNTER — TELEPHONE (OUTPATIENT)
Dept: UROLOGY | Facility: CLINIC | Age: 63
End: 2023-01-20
Payer: MEDICAID

## 2023-01-20 NOTE — TELEPHONE ENCOUNTER
's dispatch office called back and said they did a welfare check on the patient at the address that they have for him. He had a family member that was there and told them Mr. Geiger is in [place] alf.  He stated they typically attend to inmates medical needs.

## 2023-03-16 NOTE — ED PROVIDER NOTES
Subjective   The patient presents to the emergency department after being sent back here from the nursing home.  He was seen and evaluated and treated in the emergency department earlier in the day by Dr. Tom.  He was found to have had some nausea and vomiting.  He was diagnosed with a urinary tract infection and subsequently treated for that prior to discharge.  He had a abdominal imaging showed some dilated loops of bowel but no obstruction.  He had some antiemetic medications here that stopped his vomiting.  He he was not experiencing any abdominal pain at the time.  Upon returning he states that the nursing home staff was requesting him to be reevaluated.  The RN on duty had sent over a handwritten note that specified that HEMANTH Arce wanted the patient reevaluated for gastroparesis.  Patient states that he had vomited 3 times on Saturday and states that the last time he had any vomiting was 5 PM.  He reports he is not had any vomiting since then.  He denies any abdominal pain or tenderness on palpation.  He has no rebound or guarding.  He states he has had no recent fevers.  He reports no chest pain or shortness of breath.          Review of Systems   Constitutional: Negative for chills and fever.   HENT: Negative for congestion, ear pain and sore throat.    Eyes: Negative for pain.   Respiratory: Negative for cough, chest tightness and shortness of breath.    Cardiovascular: Negative for chest pain.   Gastrointestinal: Positive for nausea and vomiting. Negative for abdominal pain and diarrhea.   Genitourinary: Negative for flank pain and hematuria.   Musculoskeletal: Negative for joint swelling.   Skin: Negative for pallor.   Neurological: Negative for seizures and headaches.   All other systems reviewed and are negative.      Past Medical History:   Diagnosis Date   • Anxiety    • Arthritis    • Depression    • Diabetes mellitus (HCC)    • Disease of thyroid gland    • Gastroparesis    • GERD  (gastroesophageal reflux disease)    • Hypertension    • Insomnia    • Myopathy    • Obesity    • Preglaucoma        No Known Allergies    History reviewed. No pertinent surgical history.    History reviewed. No pertinent family history.    Social History     Socioeconomic History   • Marital status:    Tobacco Use   • Smoking status: Never Smoker   • Smokeless tobacco: Never Used   Substance and Sexual Activity   • Alcohol use: Defer   • Drug use: Defer   • Sexual activity: Defer           Objective   Physical Exam  Vitals and nursing note reviewed.   Constitutional:       General: He is not in acute distress.     Appearance: Normal appearance. He is not toxic-appearing.   HENT:      Head: Normocephalic and atraumatic.      Mouth/Throat:      Mouth: Mucous membranes are moist.   Eyes:      General: No scleral icterus.  Cardiovascular:      Rate and Rhythm: Normal rate and regular rhythm.      Pulses: Normal pulses.   Pulmonary:      Effort: Pulmonary effort is normal. No respiratory distress.      Breath sounds: Normal breath sounds.   Abdominal:      General: Abdomen is flat.      Palpations: Abdomen is soft.      Tenderness: There is no abdominal tenderness.   Musculoskeletal:         General: Normal range of motion.      Cervical back: Normal range of motion and neck supple.   Skin:     General: Skin is warm and dry.      Capillary Refill: Capillary refill takes less than 2 seconds.   Neurological:      General: No focal deficit present.      Mental Status: He is alert and oriented to person, place, and time. Mental status is at baseline.         Procedures           ED Course  ED Course as of 10/20/21 0111   Tue Oct 19, 2021   2150 I SPOKE WITH HEMANTH RODRIGUEZ WITH THE NURSING HOME. SHE STATES THAT SHE WANTS THE PT SEEN AND WORKED UP IN THE ED BY A GI DOCTOR FOR GASTROPARESIS. I ATTEMPTED TO EXPLAINED TO HER THAT HE HAD A EVALUATION AND WORKUP IN THE ED BY A PHYSICIAN ON HIS FIRST VISIT, WHICH  INCLUDED, LAB WORK, IMAGING, IV FLUIDS, MEDS, AND URINALYSIS. THE PT WAS NOT EXPERIENCING ABD PAIN AT THAT TIME AND THE PHYSICIAN DID NOT FEEL FURTHER IMAGING WITH CT WAS WARRANTED AT THAT TIME. AGAIN, SHE STATED THAT THE APPROPRIATE TREATMENT FOR GASTROPARESIS WAS AN NGT, AND AN EVALUATION BY GI. I ADVISED HER THAT HE WOULD BE RE-EVALUATED BY AN ED PROVIDER AND DISPOSITIONED APPROPRIATELY. SHE CONTINUED TO STATE THAT HE CAN NOT STAY AT HER FACILITY WITH ONLY LPN'S TO CARE FOR HIM IN HIS BUILDING IF HE CONTINUES TO VOMIT. THE PT STATES THAT HE VOMITED 3 TIMES TODAY, NONE SINCE 5PM AND NONE SO FAR WHILE IN THE ED. [TC]   2319 I reviewed the patient's test results with him.  He states that he is feeling well and has no complaints of nausea or pain at this time.  He states that he has not had any vomiting or dry heaves since 5 PM.  He is eating a popsicle at this time without difficulties and will monitor to see how he does after his p.o. challenge.  I discussed with him the treatment that HEMANTH Maldonado had requested.  I explained to him what an NG tube was in the purpose of it.  He states that he would prefer not to have it placed due to his intermittent vomiting and no persistent vomiting at this time. [TC]   Wed Oct 20, 2021   0022 The patient has tolerated his p.o. fluids and is now requesting a turkey sandwich since he has not had anything to eat at the nursing home this afternoon.  He is resting comfortably with no complaint.  He states he has had no vomiting since 5 PM.  We discussed the NG tube.  Patient states that he does not see the benefit for this and does not want it placed at this time.  He states that if he starts vomiting again then he would be agreeable to the NG tube but at this time he states he has no nausea and has not vomited for hours and does not want it placed. [TC]      ED Course User Index  [TC] Nina Lynch APRN                                           MDM  Number of Diagnoses or  Management Options  Non-intractable vomiting with nausea, unspecified vomiting type: established and improving  Urinary tract infection associated with indwelling urethral catheter, initial encounter (HCC): minor     Amount and/or Complexity of Data Reviewed  Clinical lab tests: reviewed  Tests in the radiology section of CPT®: reviewed  Decide to obtain previous medical records or to obtain history from someone other than the patient: yes    Risk of Complications, Morbidity, and/or Mortality  Presenting problems: low  Diagnostic procedures: low  Management options: low    Patient Progress  Patient progress: stable      Final diagnoses:   Non-intractable vomiting with nausea, unspecified vomiting type   Urinary tract infection associated with indwelling urethral catheter, initial encounter (HCC)       ED Disposition  ED Disposition     ED Disposition Condition Comment    Discharge Stable           Deshaun Saucedo MD  [id number] [id number] [phone]    Today  FOR FOLLOW UP         Medication List      No changes were made to your prescriptions during this visit.          Nina Lynch, APRN  10/20/21 0111     As per pt. " woke up with ringing  in left ear H/o vertigo

## (undated) DEVICE — Device: Brand: DEFENDO AIR/WATER/SUCTION AND BIOPSY VALVE

## (undated) DEVICE — EGD OR ERCP KIT: Brand: MEDLINE INDUSTRIES, INC.

## (undated) DEVICE — SOL IRRG H2O PL/BG 1000ML STRL

## (undated) DEVICE — SINGLE-USE BIOPSY FORCEPS: Brand: RADIAL JAW 4